# Patient Record
Sex: MALE | Race: BLACK OR AFRICAN AMERICAN | Employment: OTHER | URBAN - METROPOLITAN AREA
[De-identification: names, ages, dates, MRNs, and addresses within clinical notes are randomized per-mention and may not be internally consistent; named-entity substitution may affect disease eponyms.]

---

## 2017-04-03 ENCOUNTER — HOSPITAL ENCOUNTER (OUTPATIENT)
Dept: PHYSICAL THERAPY | Age: 77
Discharge: HOME | End: 2017-04-03
Payer: MEDICARE

## 2017-04-03 PROCEDURE — 97110 THERAPEUTIC EXERCISES: CPT

## 2017-04-03 PROCEDURE — 97162 PT EVAL MOD COMPLEX 30 MIN: CPT

## 2017-04-03 PROCEDURE — G8979 MOBILITY GOAL STATUS: HCPCS

## 2017-04-03 PROCEDURE — G8978 MOBILITY CURRENT STATUS: HCPCS

## 2017-04-03 NOTE — PROGRESS NOTES
Enoch [address]  [facility] [place] [facility]  [facility]  [address], [address] ,[address], [address]  Phone: [phone]  Fax: [phone] / [address] [address] PHYSICAL THERAPY SERVICES  Patient Name: Trevor Silvestre. : 1940   Medical   Diagnosis: Left hamstring muscle strain [S76.312A] Treatment Diagnosis: L quadriceps pain   Onset Date: 1 month ago     Referral Source: Neto Cadet MD [facility]): 4/3/2017   Prior Hospitalization: See medical history Provider #: [id number]   Prior Level of Function: Pain free with daily walks and tennis   Comorbidities: L quad T repair 25 year sago, R knee OA   Medications: Verified on Patient Summary List   The Plan of Care and following information is based on the information from the initial evaluation.   ===========================================================================================  Assessment / key information: Patient is a 67 y.o. male who presents to [facility] with diagnosis of  L knee/quadriceps pain. Patient reports gradual onset of L quad/knee pain that is worse in the morning and at night. He states pain does not prevent him from playing tennis and daily walks but notes pain during and after. Objective PT examination findings include (1) dec L knee ROM 2-110 degrees (2) dec strength quad 3-/5, hip abd 3+/5, HS 4-/5, glues 4-/5 MMT (3) palpable trigger points throughout quads/vastus rectus femoris (4) dec length HS and post hip mm (5) fair QS and unable to perform SLR due to weakness. A home exercise program was demonstrated and provided to address the above objective and functional deficits.  Patient can benefit from skilled PT interventions to improve ROM and strength, decrease pain, to facilitate performance of ADLs & improve overall functional status.   ===========================================================================================  Eval Complexity: History MEDIUM  Complexity : 1-2 comorbidities / personal factors will impact the outcome/ POC ;  Examination  MEDIUM Complexity : 3 Standardized tests and measures addressing body structure, function, activity limitation and / or participation in recreation ; Presentation LOW Complexity : Stable, uncomplicated ;  Decision Making MEDIUM Complexity : FOTO score of 26-74; Overall Complexity LOW   Problem List: pain affecting function, decrease ROM, decrease strength, decrease ADL/ functional abilitiies, decrease activity tolerance and decrease flexibility/ joint mobility, FOTO score 57  Treatment Plan may include any combination of the following: Therapeutic exercise, Therapeutic activities, Neuromuscular re-education, Physical agent/modality, Gait/balance training, Manual therapy including dry needling, Aquatic therapy and Patient education  Patient / Family readiness to learn indicated by: asking questions, trying to perform skills and interest  Persons(s) to be included in education: patient (P)  Barriers to Learning/Limitations: no  Measures taken:    Patient Goal (s): \"relief from pain\"   Patient self reported health status: good  Rehabilitation Potential: good   Short Term Goals: To be accomplished in  1-2  weeks:  1) Patient to be independent and compliant with initial HEP to prevent further disability. 2) Improve FOTO score to 61 indicating significant functional improvement. 3) Patient will improve quad strength in order to perform strong QS and SLR so strength is available for functional mobility.  Long Term Goals: To be accomplished in  3-4  weeks:  1) Patient to be independent & compliant with progressive HEP in preparation for D/C.  2) Improve FOTO score to 65 indicating significant functional improvement in order to return to PLOF. 3) Patient to report 75% improvement in SX indicating decreased overall pain when walking.   4) Patient to improve L quad strength to >/= 4+/5 MMT so strength is available for descending stairs. Frequency / Duration:   Patient to be seen  2-3  times per week for 3-4  weeks:  Patient / Caregiver education and instruction: self care, activity modification and exercises  G-Codes (GP): Mobility [id number] Current  CK= 40-59%   Goal  CJ= 20-39%. The severity rating is based on the FOTO Score    Therapist Signature: Carlita Cadet PT, DPT, MTC, CMTPT Date: 2/2/1635   Certification Period: 4/2/17 to 6/30/17 Time: 3:36 PM   ===========================================================================================  I certify that the above Physical Therapy Services are being furnished while the patient is under my care. I agree with the treatment plan and certify that this therapy is necessary. Physician Signature:        Date:       Time:     Please sign and return to [facility] or you may fax the signed copy to [phone]. Thank you.

## 2017-04-03 NOTE — PROGRESS NOTES
PHYSICAL THERAPY - DAILY TREATMENT NOTE    Patient Name: Julio C Avendano. Date: 4/3/2017  : 1940   YES Patient  Verified  Visit #:     Insurance: Payor: Tahir Santos / Plan: [place] MED[place] & [place] / Product Type: Medicare /      In time: 919 Out time: 530   Total Treatment Time: 55     Medicare Time Tracking (below)   Total Timed Codes (min):   1:1 Treatment Time:       TREATMENT AREA =  Left hamstring muscle strain [S76.312A]  SUBJECTIVE  Pain Level (on 0 to 10 scale):  4  / 10   Medication Changes/New allergies or changes in medical history, any new surgeries or procedures?     NO    If yes, update Summary List   Subjective Functional Status/Changes:  []  No changes reported     See POC          OBJECTIVE  Modalities Rationale:     decrease inflammation, decrease pain and increase tissue extensibility to improve patient's ability to perform functional ADLs   min [] Estim, type/location:                                      []  att     []  unatt     []  w/US     []  w/ice    []  w/heat    min []  Mechanical Traction: type/lbs                   []  pro   []  sup   []  int   []  cont    []  before manual    []  after manual    min []  Ultrasound, settings/location:      min []  Iontophoresis w/ dexamethasone, location:                                               []  take home patch       []  in clinic    min []  Ice     []  Heat    location/position:     min []  Vasopneumatic Device, press/temp:     min []  Other:    [] Skin assessment post-treatment (if applicable):    []  intact    []  redness- no adverse reaction     []redness  adverse reaction:        15 min Therapeutic Exercise:  [x]  See flow sheet   Rationale:      increase ROM and increase strength to improve the patients ability to regain full functional mobility of L knee for normal gait and stair negotiation      min Manual Therapy:    Rationale:      decrease pain, increase ROM, increase tissue extensibility and decrease trigger points to improve the patient's ability to regain full functional mobility     min Therapeutic Activity:    Rationale:    increase strength, improve coordination and increase proprioception to improve the patients ability to      min Neuromuscular Re-ed:    Rationale:    improve coordination, improve balance and increase proprioception to improve the patients ability to      min Gait Training:    Rationale:       min Patient Education:  YES  Reviewed HEP   [x]  Progressed/Changed HEP based on:   Issued written HEP and reviewed     Other Objective/Functional Measures:    See POC  TE per FS     Post Treatment Pain Level (on 0 to 10) scale:   4  / 10     ASSESSMENT  Assessment/Changes in Function:     Progressed treatment as appropriate with good patient tolerance     []  See Progress Note/Recertification   Patient will continue to benefit from skilled PT services to modify and progress therapeutic interventions, address functional mobility deficits, address ROM deficits, address strength deficits, analyze and address soft tissue restrictions, analyze and cue movement patterns, analyze and modify body mechanics/ergonomics and assess and modify postural abnormalities to attain remaining goals. Progress toward goals / Updated goals:    Progressing towards goals established in POC     PLAN  [x]  Upgrade activities as tolerated YES Continue plan of care   []  Discharge due to :    []  Other:      Therapist: Lilo Ellsworth, PT, DPT, MTC, CMTPT    Date: 4/3/2017 Time: 3:36 PM     No future appointments.

## 2017-04-06 ENCOUNTER — HOSPITAL ENCOUNTER (OUTPATIENT)
Dept: PHYSICAL THERAPY | Age: 77
Discharge: HOME | End: 2017-04-06
Payer: MEDICARE

## 2017-04-06 PROCEDURE — 97110 THERAPEUTIC EXERCISES: CPT

## 2017-04-06 PROCEDURE — 97530 THERAPEUTIC ACTIVITIES: CPT

## 2017-04-06 NOTE — PROGRESS NOTES
PHYSICAL THERAPY - DAILY TREATMENT NOTE      Patient Name: Lizette Griggs. Date: 2017  : 1940   YES Patient  Verified  Visit #:     Insurance: Payor: [place] MEDICARE / Plan: [place] MEDICARE PART A & B / Product Type: Medicare /      In time: 3:25 Out time: 4:25   Total Treatment Time: 60     Medicare Time Tracking (below)   Total Timed Codes (min):  50 1:1 Treatment Time:  50     TREATMENT AREA =  L knee pain    SUBJECTIVE    Pain Level (on 0 to 10 scale):  3  / 10   Medication Changes/New allergies or changes in medical history, any new surgeries or procedures? NO    If yes, update Summary List   Subjective Functional Status/Changes:  []  No changes reported     \"It doesn't hurt to play tennis but I don't ice after. I started doing my exercises at home. \"           OBJECTIVE    Modalities Rationale:  decrease inflammation, decrease pain and increase tissue extensibility to improve patient's ability to perform functional ADLs      min [] Estim, type/location:                                      []  att     []  unatt     []  w/US     []  w/ice    []  w/heat    min []  Mechanical Traction: type/lbs                   []  pro   []  sup   []  int   []  cont    []  before manual    []  after manual    min []  Ultrasound, settings/location:      min []  Iontophoresis w/ dexamethasone, location:                                               []  take home patch       []  in clinic   10 min [x]  Ice     []  Heat    location/position: Supine to L Knee    min []  Vasopneumatic Device, press/temp:     min []  Other:    [x] Skin assessment post-treatment (if applicable):    [x]  intact    []  redness- no adverse reaction     []redness  adverse reaction:      40 min Therapeutic Exercise:  [x]  See flow sheet   Rationale:  increase ROM and increase strength to improve the patients ability to regain full functional mobility of L knee for normal gait and stair negotiation     10 min Therapeutic Activity: Home TENS unit instruction/demonstration   Rationale:      min Neuromuscular Re-ed:    Rationale:      min Manual Therapy:    Rationale:         min Gait Training:    Rationale:      X min Patient Education:  YES  Reviewed HEP   []  Progressed/Changed HEP based on: Other Objective/Functional Measures:  L Knee ROM: 2-110     Initiated SLR, SAQs, SLS, TKE, bike, and mini squats. VCs for correct form during mini squats. Post Treatment Pain Level (on 0 to 10) scale:   0  / 10     ASSESSMENT    Assessment/Changes in Function:     Verbal and written instruction/demonstration of home TENS unit brought in by patient. Discussed utilizing ice following tennis practice/matches. No adverse effects with initiation of new TE but ext lag with SLR. []  See Progress Note/Recertification   Patient will continue to benefit from skilled PT services to modify and progress therapeutic interventions, address functional mobility deficits, address ROM deficits, address strength deficits, analyze and address soft tissue restrictions, analyze and cue movement patterns, analyze and modify body mechanics/ergonomics and assess and modify postural abnormalities to attain remaining goals. Progress toward goals / Updated goals: Working towards newly established goals. HEP established.       PLAN    [x]  Upgrade activities as tolerated YES Continue plan of care   []  Discharge due to :    []  Other:      Therapist: Parul Sánchez PTA    Date: 4/6/2017 Time: 11:48 AM

## 2017-04-13 ENCOUNTER — HOSPITAL ENCOUNTER (OUTPATIENT)
Dept: PHYSICAL THERAPY | Age: 77
Discharge: HOME | End: 2017-04-13
Payer: MEDICARE

## 2017-04-13 PROCEDURE — 97110 THERAPEUTIC EXERCISES: CPT

## 2017-04-13 NOTE — PROGRESS NOTES
PHYSICAL THERAPY - DAILY TREATMENT NOTE      Patient Name: Tom Friday. Date: 2017  : 1940   YES Patient  Verified  Visit #:   3   of   12  Insurance: Payor: [place] MEDICARE / Plan: [place] MEDICARE PART A & B / Product Type: Medicare /      In time: 9:25 Out time: 10:35   Total Treatment Time: 60     Medicare Time Tracking (below)   Total Timed Codes (min):  50 1:1 Treatment Time:  50     TREATMENT AREA =  L Knee P    SUBJECTIVE    Pain Level (on 0 to 10 scale):  1  / 10   Medication Changes/New allergies or changes in medical history, any new surgeries or procedures? NO    If yes, update Summary List   Subjective Functional Status/Changes:  []  No changes reported     \"I don't really have any pain today, just stiffness. I played tennis yesterday without pain. I sometimes do my home exercises. \"        OBJECTIVE    Modalities Rationale:   decrease inflammation, decrease pain and increase tissue extensibility to improve patient's ability to perform functional ADLs      min [] Estim, type/location:                                      []  att     []  unatt     []  w/US     []  w/ice    []  w/heat    min []  Mechanical Traction: type/lbs                   []  pro   []  sup   []  int   []  cont    []  before manual    []  after manual    min []  Ultrasound, settings/location:      min []  Iontophoresis w/ dexamethasone, location:                                               []  take home patch       []  in clinic   10 min [x]  Ice     []  Heat    location/position: Supine to L Knee    min []  Vasopneumatic Device, press/temp:     min []  Other:    [x] Skin assessment post-treatment (if applicable):    [x]  intact    []  redness- no adverse reaction     []redness  adverse reaction:      50 min Therapeutic Exercise:  [x]  See flow sheet   Rationale:   increase ROM and increase strength to improve the patients ability to regain full functional mobility of L knee for normal gait and stair negotiation      min Therapeutic Activity:    Rationale:      min Neuromuscular Re-ed:    Rationale:      min Manual Therapy:    Rationale:        min Gait Training:    Rationale:      X min Patient Education:  YES  Reviewed HEP   []  Progressed/Changed HEP based on: Other Objective/Functional Measures:    TE per flow sheet. Initiated SL hip abd, clamshells, and step ups. VC and TCs for SL hip abd; poor control   Post Treatment Pain Level (on 0 to 10) scale:   0  / 10     ASSESSMENT     Assessment/Changes in Function:     Mild ext lag noted with SLR due to quad weakness and pt fatigued by end 10 reps. Poor control with SL hip abd due to glut med weakness. Cont to encourage pt to ice following tennis matches and compliance with HEP. []  See Progress Note/Recertification   Patient will continue to benefit from skilled PT services to modify and progress therapeutic interventions, address functional mobility deficits, address ROM deficits, address strength deficits, analyze and address soft tissue restrictions, analyze and cue movement patterns, analyze and modify body mechanics/ergonomics and assess and modify postural abnormalities to attain remaining goals. Progress toward goals / Updated goals: Working towards STG #3.      PLAN    [x]  Upgrade activities as tolerated YES Continue plan of care   []  Discharge due to :    []  Other:      Therapist: Remington Duckworth PTA    Date: 4/13/2017 Time: 7:45 AM

## 2017-04-14 ENCOUNTER — APPOINTMENT (OUTPATIENT)
Dept: PHYSICAL THERAPY | Age: 77
End: 2017-04-14
Payer: MEDICARE

## 2017-04-18 ENCOUNTER — HOSPITAL ENCOUNTER (OUTPATIENT)
Dept: PHYSICAL THERAPY | Age: 77
Discharge: HOME | End: 2017-04-18
Payer: MEDICARE

## 2017-04-18 PROCEDURE — 97110 THERAPEUTIC EXERCISES: CPT

## 2017-04-18 NOTE — PROGRESS NOTES
PHYSICAL THERAPY - DAILY TREATMENT NOTE    Patient Name: Sina Mendez. Date: 2017  : 1940   YES Patient  Verified  Visit #:     Insurance: Payor: Elizabeth Camel / Plan: [place] MEDICARE PART A & B / Product Type: Medicare /      In time: 930 Out time: 3934   Total Treatment Time: 60     Medicare Time Tracking (below)   Total Timed Codes (min):  50 1:1 Treatment Time:  40     TREATMENT AREA =  Left leg pain [M79.605]  Left hamstring muscle strain [S76.312A]  SUBJECTIVE  Pain Level (on 0 to 10 scale):  1  / 10   Medication Changes/New allergies or changes in medical history, any new surgeries or procedures?     NO    If yes, update Summary List   Subjective Functional Status/Changes:  []  No changes reported     My knee and quad have still been sore but a little better       OBJECTIVE  Modalities Rationale:     decrease inflammation, decrease pain and increase tissue extensibility to improve patient's ability to perform functional ADLs   min [] Estim, type/location:                                      []  att     []  unatt     []  w/US     []  w/ice    []  w/heat    min []  Mechanical Traction: type/lbs                   []  pro   []  sup   []  int   []  cont    []  before manual    []  after manual    min []  Ultrasound, settings/location:      min []  Iontophoresis w/ dexamethasone, location:                                               []  take home patch       []  in clinic   10 min [x]  Ice     []  Heat    location/position: L knee in supine    min []  Vasopneumatic Device, press/temp:     min []  Other:    [] Skin assessment post-treatment (if applicable):    []  intact    []  redness- no adverse reaction     []redness  adverse reaction:        50/40 min Therapeutic Exercise:  [x]  See flow sheet   Rationale:      increase ROM and increase strength to improve the patients ability to regain full functional mobility of L knee for normal gait and stair negotiation      min Manual Therapy:    Rationale:      decrease pain, increase ROM, increase tissue extensibility and decrease trigger points to improve the patient's ability to regain full functional mobility     min Therapeutic Activity:    Rationale:    increase strength, improve coordination and increase proprioception to improve the patients ability to      min Neuromuscular Re-ed:    Rationale:    improve coordination, improve balance and increase proprioception to improve the patients ability to      min Gait Training:    Rationale:       min Patient Education:  YES  Reviewed HEP   [x]  Progressed/Changed HEP based on:   Issued written HEP and reviewed     Other Objective/Functional Measures:    TE per FS  Unable to perform SAQ/LAQ to full knee ext due to quad weakness    Mod lag with SLR   Post Treatment Pain Level (on 0 to 10) scale:   0  / 10     ASSESSMENT  Assessment/Changes in Function:     Progressed treatment as appropriate with good patient tolerance     []  See Progress Note/Recertification   Patient will continue to benefit from skilled PT services to modify and progress therapeutic interventions, address functional mobility deficits, address ROM deficits, address strength deficits, analyze and address soft tissue restrictions, analyze and cue movement patterns, analyze and modify body mechanics/ergonomics and assess and modify postural abnormalities to attain remaining goals.    Progress toward goals / Updated goals:    Progressing towards STG1, fair compliance     PLAN  [x]  Upgrade activities as tolerated YES Continue plan of care   []  Discharge due to :    []  Other:      Therapist: Karrie Noel PT, DPT, MTC, CMTPT    Date: 4/18/2017 Time: 3:24 PM     Future Appointments  Date Time Provider Rainer De La Cruz   5/10/2017 3:30 PM Tye Figueroa PT [facility]   5/15/2017 11:00 AM Tye Figueroa PT [facility]   5/18/2017 9:00 AM Tye Figueroa PT [facility]   5/22/2017 11:30 AM Tye Figueroa PT [facility]   5/25/2017 9:30 AM Lord Ortiz, PT [facility]   5/30/2017 9:30 AM Lord Ortiz, PT [facility]   6/1/2017 9:30 AM Lord Ortiz, PT [facility]

## 2017-05-10 ENCOUNTER — HOSPITAL ENCOUNTER (OUTPATIENT)
Dept: PHYSICAL THERAPY | Age: 77
End: 2017-05-10
Payer: MEDICARE

## 2017-05-15 ENCOUNTER — HOSPITAL ENCOUNTER (OUTPATIENT)
Dept: PHYSICAL THERAPY | Age: 77
Discharge: HOME | End: 2017-05-15
Payer: MEDICARE

## 2017-05-15 PROCEDURE — 97110 THERAPEUTIC EXERCISES: CPT

## 2017-05-15 NOTE — PROGRESS NOTES
PHYSICAL THERAPY - DAILY TREATMENT NOTE    Patient Name: Rajeev Cheema. Date: 5/15/2017  : 1940   YES Patient  Verified  Visit #:     Insurance: Payor: Marypetrona Car / Plan: [place] MEDICARE PART A & B / Product Type: Medicare /      In time: 1100 Out time: 1200   Total Treatment Time: 55     Medicare Time Tracking (below)   Total Timed Codes (min):  45 1:1 Treatment Time:  30     TREATMENT AREA =  Left leg pain [M79.605]  Left hamstring muscle strain [S76.312A]  SUBJECTIVE  Pain Level (on 0 to 10 scale):  1  / 10   Medication Changes/New allergies or changes in medical history, any new surgeries or procedures?     NO    If yes, update Summary List   Subjective Functional Status/Changes:  []  No changes reported     I didn't do my exercises while out of town       OBJECTIVE  Modalities Rationale:     decrease inflammation, decrease pain and increase tissue extensibility to improve patient's ability to perform functional ADLs   min [] Estim, type/location:                                      []  att     []  unatt     []  w/US     []  w/ice    []  w/heat    min []  Mechanical Traction: type/lbs                   []  pro   []  sup   []  int   []  cont    []  before manual    []  after manual    min []  Ultrasound, settings/location:      min []  Iontophoresis w/ dexamethasone, location:                                               []  take home patch       []  in clinic   10 min [x]  Ice     []  Heat    location/position: L knee in supine    min []  Vasopneumatic Device, press/temp:     min []  Other:    [] Skin assessment post-treatment (if applicable):    []  intact    []  redness- no adverse reaction     []redness  adverse reaction:        45/30 min Therapeutic Exercise:  [x]  See flow sheet   Rationale:      increase ROM and increase strength to improve the patients ability to regain full functional mobility of L knee for normal gait and stair negotiation      min Manual Therapy: Rationale:      decrease pain, increase ROM, increase tissue extensibility and decrease trigger points to improve the patient's ability to regain full functional mobility     min Therapeutic Activity:    Rationale:    increase strength, improve coordination and increase proprioception to improve the patients ability to      min Neuromuscular Re-ed:    Rationale:    improve coordination, improve balance and increase proprioception to improve the patients ability to      min Gait Training:    Rationale:       min Patient Education:  YES  Reviewed HEP   []  Progressed/Changed HEP based on: Other Objective/Functional Measures:    Reviewed importance of HEP compliance  Able to achieve full knee ext and good QS, required A with SLR and unable to perform SAQ/LAQto full knee ext   Post Treatment Pain Level (on 0 to 10) scale:   0  / 10     ASSESSMENT  Assessment/Changes in Function:     Progressed treatment as appropriate with good patient tolerance     []  See Progress Note/Recertification   Patient will continue to benefit from skilled PT services to modify and progress therapeutic interventions, address functional mobility deficits, address ROM deficits, address strength deficits, analyze and address soft tissue restrictions, analyze and cue movement patterns, analyze and modify body mechanics/ergonomics and assess and modify postural abnormalities to attain remaining goals.    Progress toward goals / Updated goals:    Progressing towards STG1, fair compliance     PLAN  [x]  Upgrade activities as tolerated YES Continue plan of care   []  Discharge due to :    []  Other:      Therapist: Emily Portillo, PT, DPT, MTC, CMTPT    Date: 5/15/2017 Time: 6:52 PM     Future Appointments  Date Time Provider Rainer De La Cruz   5/18/2017 9:00 AM Sana Gonzales PT [facility]   5/22/2017 11:30 AM Sana Gonzales PT [facility]   5/25/2017 9:30 AM Kelsey Isaac [facility]   5/30/2017 9:30 AM Sana Gonzales PT [facility]

## 2017-05-18 ENCOUNTER — HOSPITAL ENCOUNTER (OUTPATIENT)
Dept: PHYSICAL THERAPY | Age: 77
Discharge: HOME | End: 2017-05-18
Payer: MEDICARE

## 2017-05-18 PROCEDURE — 97110 THERAPEUTIC EXERCISES: CPT

## 2017-05-18 NOTE — PROGRESS NOTES
PHYSICAL THERAPY - DAILY TREATMENT NOTE    Patient Name: Anuradha Camacho. Date: 2017  : 1940   YES Patient  Verified  Visit #:     Insurance: Payor: Eric Antoine / Plan: [place] MED[place] B / Product Type: Medicare /      In time: 900 Out time: 1010   Total Treatment Time: 60     Medicare Time Tracking (below)   Total Timed Codes (min):  50 1:1 Treatment Time:  40     TREATMENT AREA =  Left leg pain [M79.605]  Left hamstring muscle strain [S76.312A]  SUBJECTIVE  Pain Level (on 0 to 10 scale):  4  / 10   Medication Changes/New allergies or changes in medical history, any new surgeries or procedures?     NO    If yes, update Summary List   Subjective Functional Status/Changes:  []  No changes reported     I didn't realize how weak my leg is       OBJECTIVE  Modalities Rationale:     decrease inflammation, decrease pain and increase tissue extensibility to improve patient's ability to perform functional ADLs   min [] Estim, type/location:                                      []  att     []  unatt     []  w/US     []  w/ice    []  w/heat    min []  Mechanical Traction: type/lbs                   []  pro   []  sup   []  int   []  cont    []  before manual    []  after manual    min []  Ultrasound, settings/location:      min []  Iontophoresis w/ dexamethasone, location:                                               []  take home patch       []  in clinic   10 min [x]  Ice     []  Heat    location/position: L knee in supine    min []  Vasopneumatic Device, press/temp:     min []  Other:    [] Skin assessment post-treatment (if applicable):    []  intact    []  redness- no adverse reaction     []redness  adverse reaction:        50/40 min Therapeutic Exercise:  [x]  See flow sheet   Rationale:      increase ROM and increase strength to improve the patients ability to regain full functional mobility of L knee for normal gait and stair negotiation      min Manual Therapy:    Rationale: decrease pain, increase ROM, increase tissue extensibility and decrease trigger points to improve the patient's ability to regain full functional mobility     min Therapeutic Activity:    Rationale:    increase strength, improve coordination and increase proprioception to improve the patients ability to      min Neuromuscular Re-ed:    Rationale:    improve coordination, improve balance and increase proprioception to improve the patients ability to      min Gait Training:    Rationale:       min Patient Education:  YES  Reviewed HEP   []  Progressed/Changed HEP based on: Other Objective/Functional Measures:    Able to perform SLR without A 2x5 reps but cont to have significant ext lag  TE per FS   Post Treatment Pain Level (on 0 to 10) scale:   0  / 10     ASSESSMENT  Assessment/Changes in Function:     Progressed treatment as appropriate with good patient tolerance     []  See Progress Note/Recertification   Patient will continue to benefit from skilled PT services to modify and progress therapeutic interventions, address functional mobility deficits, address ROM deficits, address strength deficits, analyze and address soft tissue restrictions, analyze and cue movement patterns, analyze and modify body mechanics/ergonomics and assess and modify postural abnormalities to attain remaining goals.    Progress toward goals / Updated goals:    Slow progress towards quad strength goals     PLAN  [x]  Upgrade activities as tolerated YES Continue plan of care   []  Discharge due to :    []  Other:      Therapist: Agnes Villavicencio PT, DPT, MTC, CMTPT    Date: 5/18/2017 Time: 3:53 PM     Future Appointments  Date Time Provider Rainer De La Cruz   5/22/2017 11:30 AM [place] [facility] [place] [facility] [place]/25/2017 9:30 AM Donna Tran [facility] [place] [facility] [place]   5/30/2017 9:30 AM Lino Da [place] [facility] [place] [facility] [place]

## 2017-05-22 ENCOUNTER — HOSPITAL ENCOUNTER (OUTPATIENT)
Dept: PHYSICAL THERAPY | Age: 77
Discharge: HOME | End: 2017-05-22
Payer: MEDICARE

## 2017-05-22 PROCEDURE — 97110 THERAPEUTIC EXERCISES: CPT

## 2017-05-22 PROCEDURE — 97032 APPL MODALITY 1+ESTIM EA 15: CPT

## 2017-05-22 NOTE — PROGRESS NOTES
PHYSICAL THERAPY - DAILY TREATMENT NOTE    Patient Name: Marylene Diver. Date: 2017  : 1940   YES Patient  Verified  Visit #:     Insurance: Payor: Dionicio Hernández / Plan: [place] MEDICARE PART A & B / Product Type: Medicare /      In time: 1130 Out time: 1240   Total Treatment Time: 65     Medicare Time Tracking (below)   Total Timed Codes (min):  55 1:1 Treatment Time:  40     TREATMENT AREA =  Left leg pain [M79.605]  Left hamstring muscle strain [S76.312A]  SUBJECTIVE  Pain Level (on 0 to 10 scale):  1  / 10   Medication Changes/New allergies or changes in medical history, any new surgeries or procedures?     NO    If yes, update Summary List   Subjective Functional Status/Changes:  []  No changes reported     I havent been doing the exercises much and didn't realize me knee is so weak     OBJECTIVE  Modalities Rationale:     decrease inflammation, decrease pain and increase tissue extensibility to improve patient's ability to perform functional ADLs  10 min [x] Estim, type/location:  [place] to L quad/VMO with QS and SAQ                                    [x]  att     []  unatt     []  w/US     []  w/ice    []  w/heat    min []  Mechanical Traction: type/lbs                   []  pro   []  sup   []  int   []  cont    []  before manual    []  after manual    min []  Ultrasound, settings/location:      min []  Iontophoresis w/ dexamethasone, location:                                               []  take home patch       []  in clinic   10 min [x]  Ice     []  Heat    location/position: L knee in supine    min []  Vasopneumatic Device, press/temp:     min []  Other:    [] Skin assessment post-treatment (if applicable):    []  intact    []  redness- no adverse reaction     []redness  adverse reaction:        45/30 min Therapeutic Exercise:  [x]  See flow sheet   Rationale:      increase ROM and increase strength to improve the patients ability to regain full functional mobility of L knee for normal gait and stair negotiation      min Manual Therapy:    Rationale:      decrease pain, increase ROM, increase tissue extensibility and decrease trigger points to improve the patient's ability to regain full functional mobility     min Therapeutic Activity:    Rationale:    increase strength, improve coordination and increase proprioception to improve the patients ability to      min Neuromuscular Re-ed:    Rationale:    improve coordination, improve balance and increase proprioception to improve the patients ability to      min Gait Training:    Rationale:       min Patient Education:  YES  Reviewed HEP   []  Progressed/Changed HEP based on: Other Objective/Functional Measures:    Unable to achieve full knee ext during SAQ/LAQ and cont to have lag with SLR  Improved QS with [place] estim but cont to require assist during SLR   Post Treatment Pain Level (on 0 to 10) scale:   0  / 10     ASSESSMENT  Assessment/Changes in Function:     Progressed treatment as appropriate with good patient tolerance     []  See Progress Note/Recertification   Patient will continue to benefit from skilled PT services to modify and progress therapeutic interventions, address functional mobility deficits, address ROM deficits, address strength deficits, analyze and address soft tissue restrictions, analyze and cue movement patterns, analyze and modify body mechanics/ergonomics and assess and modify postural abnormalities to attain remaining goals.    Progress toward goals / Updated goals:    Fair HEP compliance and progress towards STG 1, cont to review importance and encourage pt to perform HEP     PLAN  [x]  Upgrade activities as tolerated YES Continue plan of care   []  Discharge due to :    []  Other:      Therapist: Omid Portillo, PT, DPT, MTC, CMTPT    Date: 5/22/2017 Time: 3:50 PM     Future Appointments  Date Time Provider Rainer De La Cruz   5/25/2017 8:00 AM Mian WEI P[place] [facility] [place]   5/30/2017 9:30 Vanesa Bachelor, PT [facility] [place]

## 2017-05-25 ENCOUNTER — HOSPITAL ENCOUNTER (OUTPATIENT)
Dept: PHYSICAL THERAPY | Age: 77
Discharge: HOME | End: 2017-05-25
Payer: MEDICARE

## 2017-05-25 ENCOUNTER — APPOINTMENT (OUTPATIENT)
Dept: PHYSICAL THERAPY | Age: 77
End: 2017-05-25
Payer: MEDICARE

## 2017-05-25 PROCEDURE — 97110 THERAPEUTIC EXERCISES: CPT

## 2017-05-25 NOTE — PROGRESS NOTES
PHYSICAL THERAPY - DAILY TREATMENT NOTE    Patient Name: Sandy Venegas. Date: 2017  : 1940   YES Patient  Verified  Visit #:     Insurance: Payor: Koffi Sanders / Plan: [place] MEDICARE PART A & B / Product Type: Medicare /      In time: 8:00 Out time: 9:10   Total Treatment Time: 70     Medicare Time Tracking (below)   Total Timed Codes (min):  60 1:1 Treatment Time:  45     TREATMENT AREA =  Left leg pain [M79.605]  Left hamstring muscle strain [S76.312A]  SUBJECTIVE  Pain Level (on 0 to 10 scale):  0/ 10   Medication Changes/New allergies or changes in medical history, any new surgeries or procedures? NO    If yes, update Summary List   Subjective Functional Status/Changes:  []  No changes reported     \"My L knee has been okay, it hurts after playing tennis and I don't ice it. My R knee has also been bothering me some. I have not played tennis in ~ 2 weeks due to an infection in my R toe. \"       OBJECTIVE  Modalities Rationale:     decrease inflammation, decrease pain and increase tissue extensibility to improve patient's ability to perform functional ADLs  TC: NV min [x] Estim, type/location:  [place] to L quad/VMO with QS and SAQ                                    [x]  att     []  unatt     []  w/US     []  w/ice    []  w/heat    min []  Mechanical Traction: type/lbs                   []  pro   []  sup   []  int   []  cont    []  before manual    []  after manual    min []  Ultrasound, settings/location:      min []  Iontophoresis w/ dexamethasone, location:                                               []  take home patch       []  in clinic   10 min [x]  Ice     []  Heat    location/position: L knee in supine    min []  Vasopneumatic Device, press/temp:     min []  Other:    [x] Skin assessment post-treatment (if applicable):    [x]  intact    []  redness- no adverse reaction     []redness  adverse reaction:        45/60 min Therapeutic Exercise:  [x]  See flow sheet Rationale:      increase ROM and increase strength to improve the patients ability to regain full functional mobility of L knee for normal gait and stair negotiation      min Manual Therapy:    Rationale:      decrease pain, increase ROM, increase tissue extensibility and decrease trigger points to improve the patient's ability to regain full functional mobility     min Therapeutic Activity:    Rationale:    increase strength, improve coordination and increase proprioception to improve the patients ability to      min Neuromuscular Re-ed:    Rationale:    improve coordination, improve balance and increase proprioception to improve the patients ability to      min Gait Training:    Rationale:      X min Patient Education:  YES  Reviewed HEP   []  Progressed/Changed HEP based on: Other Objective/Functional Measures:    TE per flow sheet. Post Treatment Pain Level (on 0 to 10) scale:   0  / 10     ASSESSMENT  Assessment/Changes in Function:     Unable to achieve full knee ext during SAQ/LAQ and cont to have lag with SLR as well as fatigues quickly. []  See Progress Note/Recertification   Patient will continue to benefit from skilled PT services to modify and progress therapeutic interventions, address functional mobility deficits, address ROM deficits, address strength deficits, analyze and address soft tissue restrictions, analyze and cue movement patterns, analyze and modify body mechanics/ergonomics and assess and modify postural abnormalities to attain remaining goals. Progress toward goals / Updated goals:    Cont to review importance and encourage pt to perform HEP and utilizing ice following tennis sessions.       PLAN  [x]  Upgrade activities as tolerated YES Continue plan of care   []  Discharge due to :    []  Other:      Therapist: Juan Carlos Piña PTA    Date: 5/25/2017 Time: 3:50 PM     Future Appointments  Date Time Provider Rainer De La Cruz   5/25/2017 8:00 AM Benita Hernandez P[place] [facility] [place] [facility] [place]   5/30/2017 9:30 AM Ayad Moreno PT [facility] [place] [facility] [place]

## 2017-05-30 ENCOUNTER — HOSPITAL ENCOUNTER (OUTPATIENT)
Dept: PHYSICAL THERAPY | Age: 77
Discharge: HOME | End: 2017-05-30
Payer: MEDICARE

## 2017-05-30 PROCEDURE — 97032 APPL MODALITY 1+ESTIM EA 15: CPT

## 2017-05-30 PROCEDURE — 97110 THERAPEUTIC EXERCISES: CPT

## 2017-05-30 NOTE — PROGRESS NOTES
PHYSICAL THERAPY - DAILY TREATMENT NOTE    Patient Name: Hien Jeong. Date: 2017  : 1940   YES Patient  Verified  Visit #:     Insurance: Payor: Krishna Santiago / Plan: [place] MEDICARE PART A & B / Product Type: Medicare /      In time: 930 Out time: 2754   Total Treatment Time: 65     Medicare Time Tracking (below)   Total Timed Codes (min):  55 1:1 Treatment Time:  40     TREATMENT AREA =  Left leg pain [M79.605]  Left hamstring muscle strain [S76.312A]  SUBJECTIVE  Pain Level (on 0 to 10 scale):  0  / 10   Medication Changes/New allergies or changes in medical history, any new surgeries or procedures?     NO    If yes, update Summary List   Subjective Functional Status/Changes:  []  No changes reported     Luis Labella been doing my exercises some     OBJECTIVE  Modalities Rationale:     decrease inflammation, decrease pain and increase tissue extensibility to improve patient's ability to perform functional ADLs  10 min [x] Estim, type/location: [place] to rectus femoris and VMO 10/10sec with QS and SLR with A                                    [x]  att     []  unatt     []  w/US     []  w/ice    []  w/heat    min []  Mechanical Traction: type/lbs                   []  pro   []  sup   []  int   []  cont    []  before manual    []  after manual    min []  Ultrasound, settings/location:      min []  Iontophoresis w/ dexamethasone, location:                                               []  take home patch       []  in clinic   10 min [x]  Ice     []  Heat    location/position: L knee in supine    min []  Vasopneumatic Device, press/temp:     min []  Other:    [] Skin assessment post-treatment (if applicable):    []  intact    []  redness- no adverse reaction     []redness  adverse reaction:        45/30 min Therapeutic Exercise:  [x]  See flow sheet   Rationale:      increase ROM and increase strength to improve the patients ability to regain full functional mobility of L knee for normal gait and stair negotiation      min Manual Therapy:    Rationale:      decrease pain, increase ROM, increase tissue extensibility and decrease trigger points to improve the patient's ability to regain full functional mobility     min Therapeutic Activity:    Rationale:    increase strength, improve coordination and increase proprioception to improve the patients ability to      min Neuromuscular Re-ed:    Rationale:    improve coordination, improve balance and increase proprioception to improve the patients ability to      min Gait Training:    Rationale:       min Patient Education:  YES  Reviewed HEP   []  Progressed/Changed HEP based on: Other Objective/Functional Measures:    Improved QS with use of NMES but cont to require assist during SLR due to significant lag and quad weakness     Post Treatment Pain Level (on 0 to 10) scale:   0  / 10     ASSESSMENT  Assessment/Changes in Function:     Progressed treatment as appropriate with good patient tolerance     []  See Progress Note/Recertification   Patient will continue to benefit from skilled PT services to modify and progress therapeutic interventions, address functional mobility deficits, address ROM deficits, address strength deficits, analyze and address soft tissue restrictions, analyze and cue movement patterns, analyze and modify body mechanics/ergonomics and assess and modify postural abnormalities to attain remaining goals.    Progress toward goals / Updated goals:    Partially met STG1, progressing towards STG1      PLAN  [x]  Upgrade activities as tolerated YES Continue plan of care   []  Discharge due to :    []  Other:      Therapist: Taz Castro PT, DPT, MTC, CMTPT    Date: 5/30/2017 Time: 12:19 PM     Future Appointments  Date Time Provider Rainer De La Cruz   6/7/2017 11:30 AM Baldemar Galindo PT [facility] [place]   6/12/2017 10:30 AM Baldemar Galindo PT [facility] [place]   6/15/2017 10:30 AM Baldemar Galindo PT [facility] [place]   6/19/2017 11:00 AM Ashley HANSON Marlen Quintero [facility] [place]

## 2017-06-01 ENCOUNTER — APPOINTMENT (OUTPATIENT)
Dept: PHYSICAL THERAPY | Age: 77
End: 2017-06-01
Payer: MEDICARE

## 2017-06-07 ENCOUNTER — APPOINTMENT (OUTPATIENT)
Dept: PHYSICAL THERAPY | Age: 77
End: 2017-06-07
Payer: MEDICARE

## 2017-06-12 ENCOUNTER — HOSPITAL ENCOUNTER (OUTPATIENT)
Dept: PHYSICAL THERAPY | Age: 77
Discharge: HOME | End: 2017-06-12
Payer: MEDICARE

## 2017-06-12 PROCEDURE — 97110 THERAPEUTIC EXERCISES: CPT

## 2017-06-12 NOTE — PROGRESS NOTES
PHYSICAL THERAPY - DAILY TREATMENT NOTE    Patient Name: Mack Isabel. Date: 2017  : 1940   YES Patient  Verified  Visit #:   10   of   12  Insurance: Payor: Indira Magana / Plan: [place] MEDICARE PART A & B / Product Type: Medicare /      In time: 1235 Out time: 1130   Total Treatment Time: 60     Medicare Time Tracking (below)   Total Timed Codes (min):  50 1:1 Treatment Time:  40     TREATMENT AREA =  Left leg pain [M79.605]  Left hamstring muscle strain [S76.312A]  SUBJECTIVE  Pain Level (on 0 to 10 scale):  0  / 10   Medication Changes/New allergies or changes in medical history, any new surgeries or procedures?     NO    If yes, update Summary List   Subjective Functional Status/Changes:  []  No changes reported     Stairs are easier     OBJECTIVE  Modalities Rationale:     decrease inflammation, decrease pain and increase tissue extensibility to improve patient's ability to perform functional ADLs   min [x] Estim, type/location:                                     [x]  att     []  unatt     []  w/US     []  w/ice    []  w/heat    min []  Mechanical Traction: type/lbs                   []  pro   []  sup   []  int   []  cont    []  before manual    []  after manual    min []  Ultrasound, settings/location:      min []  Iontophoresis w/ dexamethasone, location:                                               []  take home patch       []  in clinic   10 min [x]  Ice     []  Heat    location/position: L knee in supine    min []  Vasopneumatic Device, press/temp:     min []  Other:    [] Skin assessment post-treatment (if applicable):    []  intact    []  redness- no adverse reaction     []redness  adverse reaction:        50/40 min Therapeutic Exercise:  [x]  See flow sheet   Rationale:      increase ROM and increase strength to improve the patients ability to regain full functional mobility of L knee for normal gait and stair negotiation      min Manual Therapy:    Rationale:      decrease pain, increase ROM, increase tissue extensibility and decrease trigger points to improve the patient's ability to regain full functional mobility     min Therapeutic Activity:    Rationale:    increase strength, improve coordination and increase proprioception to improve the patients ability to      min Neuromuscular Re-ed:    Rationale:    improve coordination, improve balance and increase proprioception to improve the patients ability to      min Gait Training:    Rationale:       min Patient Education:  YES  Reviewed HEP   []  Progressed/Changed HEP based on: Other Objective/Functional Measures:    TE per FS   Post Treatment Pain Level (on 0 to 10) scale:   0  / 10     ASSESSMENT  Assessment/Changes in Function:     Progressed treatment as appropriate with good patient tolerance     []  See Progress Note/Recertification   Patient will continue to benefit from skilled PT services to modify and progress therapeutic interventions, address functional mobility deficits, address ROM deficits, address strength deficits, analyze and address soft tissue restrictions, analyze and cue movement patterns, analyze and modify body mechanics/ergonomics and assess and modify postural abnormalities to attain remaining goals.    Progress toward goals / Updated goals:    Met STG1, reports HEP compliance     PLAN  [x]  Upgrade activities as tolerated YES Continue plan of care   []  Discharge due to :    []  Other:      Therapist: Omid Portillo PT, DPT, MTC, CMTPT    Date: 6/12/2017 Time: 12:19 PM     Future Appointments  Date Time Provider Rainer De La Cruz   6/15/2017 10:30 AM [place] [facility] [place]   6/19/2017 11:00 AM [place]yda Shah [place] [facility] [place]

## 2017-06-15 ENCOUNTER — HOSPITAL ENCOUNTER (OUTPATIENT)
Dept: PHYSICAL THERAPY | Age: 77
Discharge: HOME | End: 2017-06-15
Payer: MEDICARE

## 2017-06-15 PROCEDURE — G8979 MOBILITY GOAL STATUS: HCPCS

## 2017-06-15 PROCEDURE — 97110 THERAPEUTIC EXERCISES: CPT

## 2017-06-15 PROCEDURE — G8980 MOBILITY D/C STATUS: HCPCS

## 2017-06-15 NOTE — PROGRESS NOTES
PHYSICAL THERAPY - DAILY TREATMENT NOTE    Patient Name: Kenzie Adair. Date: 6/15/2017  : 1940   YES Patient  Verified  Visit #:     Insurance: Payor: Mitchell Christianson / Plan: [place] MEDICARE PART A & B / Product Type: Medicare /      In time: 6422 Out time: 1140   Total Treatment Time: 55     Medicare Time Tracking (below)   Total Timed Codes (min):  45 1:1 Treatment Time:  25     TREATMENT AREA =  Left leg pain [M79.605]  Left hamstring muscle strain [S76.312A]  SUBJECTIVE  Pain Level (on 0 to 10 scale):  0  / 10   Medication Changes/New allergies or changes in medical history, any new surgeries or procedures? NO    If yes, update Summary List   Subjective Functional Status/Changes:  []  No changes reported     Pt reports he is no longer having knee pain, playing tennis 2-3x/week, and stairs are easier.          OBJECTIVE  Modalities Rationale:     decrease inflammation, decrease pain and increase tissue extensibility to improve patient's ability to perform functional ADLs   min [] Estim, type/location:                                      []  att     []  unatt     []  w/US     []  w/ice    []  w/heat    min []  Mechanical Traction: type/lbs                   []  pro   []  sup   []  int   []  cont    []  before manual    []  after manual    min []  Ultrasound, settings/location:      min []  Iontophoresis w/ dexamethasone, location:                                               []  take home patch       []  in clinic   10 min [x]  Ice     []  Heat    location/position: L knee in supine    min []  Vasopneumatic Device, press/temp:     min []  Other:    [] Skin assessment post-treatment (if applicable):    []  intact    []  redness- no adverse reaction     []redness  adverse reaction:        45/25 min Therapeutic Exercise:  [x]  See flow sheet   Rationale:      increase ROM and increase strength to improve the patients ability to regain functional mobility of L knee for walking and playing tennis      min Manual Therapy:    Rationale:      decrease pain, increase ROM, increase tissue extensibility and decrease trigger points to improve the patient's ability to regain full functional mobility     min Therapeutic Activity:    Rationale:    increase strength, improve coordination and increase proprioception to improve the patients ability to      min Neuromuscular Re-ed:    Rationale:    improve coordination, improve balance and increase proprioception to improve the patients ability to      min Gait Training:    Rationale:       min Patient Education:  YES  Reviewed HEP   [x]  Progressed/Changed HEP based on:   reviewed     Other Objective/Functional Measures:    TE per FS. FOTO 60     Post Treatment Pain Level (on 0 to 10) scale:   0  / 10     ASSESSMENT  Assessment/Changes in Function:     Progressed treatment as appropriate with good patient tolerance     [x]  See Progress Note/Recertification   Patient will continue to benefit from skilled PT services to modify and progress therapeutic interventions, address functional mobility deficits, address ROM deficits, address strength deficits, analyze and address soft tissue restrictions, analyze and cue movement patterns, analyze and modify body mechanics/ergonomics and assess and modify postural abnormalities to attain remaining goals. Progress toward goals / Updated goals:    See DC     PLAN  []  Upgrade activities as tolerated YES Continue plan of care   [x]  Discharge due to : Program complete, pt requested DC   []  Other:      Therapist: Katharina Oropeza PT, DPT, MTC, CMTPT  Benita Florence [facility]    Date: 6/15/2017 Time: 12:05 PM     No future appointments.

## 2017-06-15 NOTE — PROGRESS NOTES
Enoch Asifkikim Henderson [address]  [facility] [place] [facility]  [facility], [address] ,[address], [address]  Phone: [phone]  Fax: [phone] FOR PHYSICAL THERAPY          Patient Name: Marylene Diver. : 1940   Treatment/Medical Diagnosis: Left leg pain [M79.605]  Left hamstring muscle strain [S76.312A]   Onset Date: 1 month prior to eval    Referral Source: Linda Girard MD [facility]): 17   Prior Hospitalization: See Medical History Provider #: [id number]   Prior Level of Function: Pain free with daily walks and tennis   Comorbidities: L quad T repair 25 year sago, R knee OA   Medications: Verified on Patient Summary List   Visits from [facility]: 11 Missed Visits: 0     Goal/Measure of Progress Goal Met? 1. Patient to be independent & compliant with progressive HEP in preparation for D/C. Status at last Eval: established Current Status: I with HEP yes   2. Improve FOTO score to 65 indicating significant functional improvement in order to return to PLOF. Status at last Eval: 57 Current Status: 60 Partially met   3. Patient to improve L quad strength to >/= 4+/5 MMT so strength is available for descending stairs. Status at last Eval: 3-/5 MMT Current Status: 4-/5 MMt Partially met     Key Functional Changes/Progress: Patient is a 67 y.o. male who presents to [facility] with diagnosis of  L knee/quadriceps pain. Mr. Shona Gayle made slow progress in PT, likely due to poor HEP compliance but reported improved compliance at time of DC. He noted full resolve of pain and swelling, increased ease with stair negotiation and had returned to playing tennis. L quadriceps strength remained most limited but was slowly improving. G-Codes (GP): Mobility  [id number] Goal  CJ= 20-39%  D/C  CK= 40-59%. The severity rating is based on the FOTO Score    Assessments/Recommendations: Discontinue therapy.  Progressing towards or have reached established goals. If you have any questions/comments please contact us directly at [phone]. Thank you for allowing us to assist in the care of your patient.     Therapist Signature: Carlita Cadet PT, KEVONT, MTC, CMTPT Date: 6/15/2017   Reporting Period: 5/15/17 to 6/15/17 Time: 12:29 PM

## 2017-06-19 ENCOUNTER — APPOINTMENT (OUTPATIENT)
Dept: PHYSICAL THERAPY | Age: 77
End: 2017-06-19
Payer: MEDICARE

## 2017-09-12 NOTE — PROGRESS NOTES
[address] PHYSICAL THERAPY  [address] [facility] [address], [place], [address] -   Phone: [phone]  Fax: [phone]  [x]  PROGRESS NOTE  []   [facility] SUMMARY  Patient Name: Marylene Diver. : 1940   Treatment Diagnosis: Left leg pain [M79.605]  Left hamstring muscle strain [U53.922J]     Referral Source: Linda Girard MD     Date of Initial Visit: 4/3/17 Attended Visits: 5 Missed Visits: 0     SUMMARY OF TREATMENT  Therapeutic exercises including ROM, strengthening and stretching; gait and balance training, postural re-education, modalities: CP, HEP instruction. CURRENT STATUS  Patient is a 67 y.o. male who presents to [facility] at [facility] with diagnosis of  L knee/quadriceps pain. He has returned to PT after 1 month absence while [place]. Shona Diallo notes dec L knee pain but reports poor HEP compliance while traveling and continues to have significant L quadriceps weakness. Goal/Measure of Progress Goal Met? 1. Patient to be independent and compliant with initial HEP to prevent further disability. Status at last Eval: Established Current Status: Non compliant no   2. Patient will improve quad strength in order to perform strong QS and SLR so strength is available for functional mobility. Status at last Eval: Pain, unable to perform Current Status: Fair QS  Unable to perform SLR no     New Goals to be achieved in __3-4__  Weeks:  1. Patient to be independent & compliant with progressive HEP in preparation for D/C.   2.  Patient to improve L quad strength to >/= 4+/5 MMT so strength is available for descending stairs. 3.  Improve FOTO score to 65 indicating significant functional improvement in order to return to PLOF. G-Codes (GP): Mobility [id number] Current  CK= 40-59%  [id number] Goal  CJ= 20-39%. The severity rating is based on the FOTO Score    RECOMMENDATIONS  Continue therapy per initial plan/protocol    Specifics:  The patient could benefit from continued PT 2-3x/week for 3-4 weeks to progress towards achieving all LTGs. If you have any questions/comments please contact us directly at [phone]. Thank you for allowing us to assist in the care of your patient. Therapist Signature: Lovely Jean Baptiste, PT, DPT, MTC, CMTPT Date: 5/15/17   Reporting Period: 4/3/17 to 5/15/17 Time: 12:05 PM   NOTE TO PHYSICIAN:  PLEASE COMPLETE THE ORDERS BELOW AND FAX TO   [facility]: [phone]  If you are unable to process this request in 24 hours please contact our office: [phone]    ___ I have read the above report and request that my patient continue as recommended.   ___ I have read the above report and request that my patient continue therapy with the following changes/special instructions:_________________________________________________________   ___ I have read the above report and request that my patient be discharged from therapy.      Physician Signature:        Date:       Time:

## 2018-01-01 ENCOUNTER — ANESTHESIA EVENT (OUTPATIENT)
Dept: SURGERY | Age: 78
DRG: 657 | End: 2018-01-01
Payer: MEDICARE

## 2018-01-01 ENCOUNTER — HOSPITAL ENCOUNTER (INPATIENT)
Age: 78
LOS: 2 days | Discharge: HOME | DRG: 657 | End: 2018-09-08
Attending: UROLOGY | Admitting: UROLOGY
Payer: MEDICARE

## 2018-01-01 ENCOUNTER — HOSPITAL ENCOUNTER (OUTPATIENT)
Dept: LAB | Age: 78
Discharge: HOME | End: 2018-10-10

## 2018-01-01 ENCOUNTER — ANESTHESIA (OUTPATIENT)
Dept: SURGERY | Age: 78
DRG: 657 | End: 2018-01-01
Payer: MEDICARE

## 2018-01-01 VITALS
BODY MASS INDEX: 22.34 KG/M2 | RESPIRATION RATE: 18 BRPM | HEART RATE: 91 BPM | HEIGHT: 76 IN | TEMPERATURE: 98.3 F | SYSTOLIC BLOOD PRESSURE: 107 MMHG | DIASTOLIC BLOOD PRESSURE: 60 MMHG | OXYGEN SATURATION: 95 % | WEIGHT: 183.44 LBS

## 2018-01-01 LAB
ABO + RH BLD: NORMAL
ANION GAP SERPL CALC-SCNC: 6 MMOL/L (ref 3–18)
ANION GAP SERPL CALC-SCNC: 7 MMOL/L (ref 3–18)
BLOOD GROUP ANTIBODIES SERPL: NORMAL
BUN SERPL-MCNC: 14 MG/DL (ref 7–18)
BUN SERPL-MCNC: 17 MG/DL (ref 7–18)
BUN/CREAT SERPL: 14 (ref 12–20)
BUN/CREAT SERPL: 15 (ref 12–20)
CALCIUM SERPL-MCNC: 8.6 MG/DL (ref 8.5–10.1)
CALCIUM SERPL-MCNC: 8.8 MG/DL (ref 8.5–10.1)
CHLORIDE SERPL-SCNC: 102 MMOL/L (ref 100–108)
CHLORIDE SERPL-SCNC: 105 MMOL/L (ref 100–108)
CO2 SERPL-SCNC: 30 MMOL/L (ref 21–32)
CO2 SERPL-SCNC: 31 MMOL/L (ref 21–32)
CREAT FLD-MCNC: 1.04 MG/DL
CREAT SERPL-MCNC: 0.97 MG/DL (ref 0.6–1.3)
CREAT SERPL-MCNC: 1.16 MG/DL (ref 0.6–1.3)
ERYTHROCYTE [DISTWIDTH] IN BLOOD BY AUTOMATED COUNT: 12.4 % (ref 11.6–14.5)
GLUCOSE BLD STRIP.AUTO-MCNC: 108 MG/DL (ref 70–110)
GLUCOSE BLD STRIP.AUTO-MCNC: 152 MG/DL (ref 70–110)
GLUCOSE BLD STRIP.AUTO-MCNC: 172 MG/DL (ref 70–110)
GLUCOSE BLD STRIP.AUTO-MCNC: 172 MG/DL (ref 70–110)
GLUCOSE BLD STRIP.AUTO-MCNC: 181 MG/DL (ref 70–110)
GLUCOSE BLD STRIP.AUTO-MCNC: 185 MG/DL (ref 70–110)
GLUCOSE BLD STRIP.AUTO-MCNC: 190 MG/DL (ref 70–110)
GLUCOSE BLD STRIP.AUTO-MCNC: 195 MG/DL (ref 70–110)
GLUCOSE BLD STRIP.AUTO-MCNC: 204 MG/DL (ref 70–110)
GLUCOSE BLD STRIP.AUTO-MCNC: 238 MG/DL (ref 70–110)
GLUCOSE BLD STRIP.AUTO-MCNC: 80 MG/DL (ref 70–110)
GLUCOSE SERPL-MCNC: 165 MG/DL (ref 74–99)
GLUCOSE SERPL-MCNC: 181 MG/DL (ref 74–99)
HBA1C MFR BLD: 8.5 % (ref 4.2–5.6)
HCT VFR BLD AUTO: 33.3 % (ref 36–48)
HCT VFR BLD AUTO: 35.9 % (ref 36–48)
HGB BLD-MCNC: 10.4 G/DL (ref 13–16)
HGB BLD-MCNC: 11.4 G/DL (ref 13–16)
MCH RBC QN AUTO: 30.7 PG (ref 24–34)
MCHC RBC AUTO-ENTMCNC: 31.2 G/DL (ref 31–37)
MCV RBC AUTO: 98.2 FL (ref 74–97)
PLATELET # BLD AUTO: 327 K/UL (ref 135–420)
PMV BLD AUTO: 10.4 FL (ref 9.2–11.8)
POTASSIUM SERPL-SCNC: 4.7 MMOL/L (ref 3.5–5.5)
POTASSIUM SERPL-SCNC: 4.9 MMOL/L (ref 3.5–5.5)
RBC # BLD AUTO: 3.39 M/UL (ref 4.7–5.5)
SODIUM SERPL-SCNC: 140 MMOL/L (ref 136–145)
SODIUM SERPL-SCNC: 141 MMOL/L (ref 136–145)
SPECIMEN EXP DATE BLD: NORMAL
SPECIMEN SOURCE FLD: NORMAL
WBC # BLD AUTO: 8.9 K/UL (ref 4.6–13.2)

## 2018-01-01 PROCEDURE — 82962 GLUCOSE BLOOD TEST: CPT

## 2018-01-01 PROCEDURE — 77030018842 HC SOL IRR SOD CL 9% BAXT -A: Performed by: UROLOGY

## 2018-01-01 PROCEDURE — 74011250637 HC RX REV CODE- 250/637: Performed by: UROLOGY

## 2018-01-01 PROCEDURE — 88307 TISSUE EXAM BY PATHOLOGIST: CPT | Performed by: UROLOGY

## 2018-01-01 PROCEDURE — 77030002966 HC SUT PDS J&J -A: Performed by: UROLOGY

## 2018-01-01 PROCEDURE — 65270000029 HC RM PRIVATE

## 2018-01-01 PROCEDURE — 74011636637 HC RX REV CODE- 636/637: Performed by: NURSE PRACTITIONER

## 2018-01-01 PROCEDURE — 77030013079 HC BLNKT BAIR HGGR 3M -A: Performed by: ANESTHESIOLOGY

## 2018-01-01 PROCEDURE — 0TT74ZZ RESECTION OF LEFT URETER, PERCUTANEOUS ENDOSCOPIC APPROACH: ICD-10-PCS | Performed by: UROLOGY

## 2018-01-01 PROCEDURE — 77030027876 HC STPLR ENDOSC FLX PWR J&J -G1: Performed by: UROLOGY

## 2018-01-01 PROCEDURE — 77030005546 HC CATH URETH FOL 3W BARD -A: Performed by: UROLOGY

## 2018-01-01 PROCEDURE — 77030009848 HC PASSR SUT SET COOP -C: Performed by: UROLOGY

## 2018-01-01 PROCEDURE — 74011636637 HC RX REV CODE- 636/637

## 2018-01-01 PROCEDURE — 77030020268 HC MISC GENERAL SUPPLY: Performed by: UROLOGY

## 2018-01-01 PROCEDURE — 77030009527 HC GEL PRT SYS AMR -E: Performed by: UROLOGY

## 2018-01-01 PROCEDURE — 36415 COLL VENOUS BLD VENIPUNCTURE: CPT

## 2018-01-01 PROCEDURE — 74011250636 HC RX REV CODE- 250/636: Performed by: UROLOGY

## 2018-01-01 PROCEDURE — 77030008462 HC STPLR SKN PROX J&J -A: Performed by: UROLOGY

## 2018-01-01 PROCEDURE — 77030008771 HC TU NG SALEM SUMP -A: Performed by: ANESTHESIOLOGY

## 2018-01-01 PROCEDURE — 74011636637 HC RX REV CODE- 636/637: Performed by: UROLOGY

## 2018-01-01 PROCEDURE — 77030002986 HC SUT PROL J&J -A: Performed by: UROLOGY

## 2018-01-01 PROCEDURE — 74011250636 HC RX REV CODE- 250/636: Performed by: NURSE ANESTHETIST, CERTIFIED REGISTERED

## 2018-01-01 PROCEDURE — 0TT14ZZ RESECTION OF LEFT KIDNEY, PERCUTANEOUS ENDOSCOPIC APPROACH: ICD-10-PCS | Performed by: UROLOGY

## 2018-01-01 PROCEDURE — 77030018719 HC DRSG PTCH ANTIMIC J&J -A: Performed by: UROLOGY

## 2018-01-01 PROCEDURE — 77030016151 HC PROTCTR LNS DFOG COVD -B: Performed by: UROLOGY

## 2018-01-01 PROCEDURE — 77030032490 HC SLV COMPR SCD KNE COVD -B: Performed by: UROLOGY

## 2018-01-01 PROCEDURE — 77030003028 HC SUT VCRL J&J -A: Performed by: UROLOGY

## 2018-01-01 PROCEDURE — 77030010545

## 2018-01-01 PROCEDURE — 77030020703 HC SEAL CANN DISP INTU -B: Performed by: UROLOGY

## 2018-01-01 PROCEDURE — 77030035277 HC OBTRTR BLDELSS DISP INTU -B: Performed by: UROLOGY

## 2018-01-01 PROCEDURE — 77030035048 HC TRCR ENDOSC OPTCL COVD -B: Performed by: UROLOGY

## 2018-01-01 PROCEDURE — 80048 BASIC METABOLIC PNL TOTAL CA: CPT | Performed by: UROLOGY

## 2018-01-01 PROCEDURE — 77030012422 HC DRN WND COVD -A: Performed by: UROLOGY

## 2018-01-01 PROCEDURE — 76210000006 HC OR PH I REC 0.5 TO 1 HR: Performed by: UROLOGY

## 2018-01-01 PROCEDURE — 77030010507 HC ADH SKN DERMBND J&J -B: Performed by: UROLOGY

## 2018-01-01 PROCEDURE — 77030009852 HC PCH RTVR ENDOSC COVD -B: Performed by: UROLOGY

## 2018-01-01 PROCEDURE — 80048 BASIC METABOLIC PNL TOTAL CA: CPT

## 2018-01-01 PROCEDURE — 36415 COLL VENOUS BLD VENIPUNCTURE: CPT | Performed by: UROLOGY

## 2018-01-01 PROCEDURE — 77030008477 HC STYL SATN SLP COVD -A: Performed by: ANESTHESIOLOGY

## 2018-01-01 PROCEDURE — 07TD4ZZ RESECTION OF AORTIC LYMPHATIC, PERCUTANEOUS ENDOSCOPIC APPROACH: ICD-10-PCS | Performed by: UROLOGY

## 2018-01-01 PROCEDURE — 77030013567 HC DRN WND RESERV BARD -A: Performed by: UROLOGY

## 2018-01-01 PROCEDURE — 76060000041 HC ANESTHESIA 5 TO 5.5 HR: Performed by: UROLOGY

## 2018-01-01 PROCEDURE — 77030035045 HC TRCR ENDOSC VRSPRT BLDLSS COVD -B: Performed by: UROLOGY

## 2018-01-01 PROCEDURE — 77030012407 HC DRN WND BARD -B: Performed by: UROLOGY

## 2018-01-01 PROCEDURE — 77030010513 HC APPL CLP LIG J&J -C: Performed by: UROLOGY

## 2018-01-01 PROCEDURE — 77030031139 HC SUT VCRL2 J&J -A: Performed by: UROLOGY

## 2018-01-01 PROCEDURE — 77030027138 HC INCENT SPIROMETER -A

## 2018-01-01 PROCEDURE — 77030010545: Performed by: UROLOGY

## 2018-01-01 PROCEDURE — 74011000272 HC RX REV CODE- 272: Performed by: UROLOGY

## 2018-01-01 PROCEDURE — 77030014647 HC SEAL FBRN TISSL BAXT -D: Performed by: UROLOGY

## 2018-01-01 PROCEDURE — 77030010517 HC APPL SEAL FLOSEL BAXT -B: Performed by: UROLOGY

## 2018-01-01 PROCEDURE — 77030020263 HC SOL INJ SOD CL0.9% LFCR 1000ML: Performed by: UROLOGY

## 2018-01-01 PROCEDURE — 74011250636 HC RX REV CODE- 250/636

## 2018-01-01 PROCEDURE — 77030034850: Performed by: UROLOGY

## 2018-01-01 PROCEDURE — 77030007956 HC PCH ENDOSC SPEC COVD -C: Performed by: UROLOGY

## 2018-01-01 PROCEDURE — 74011000250 HC RX REV CODE- 250: Performed by: UROLOGY

## 2018-01-01 PROCEDURE — 83036 HEMOGLOBIN GLYCOSYLATED A1C: CPT | Performed by: NURSE PRACTITIONER

## 2018-01-01 PROCEDURE — 85027 COMPLETE CBC AUTOMATED: CPT | Performed by: UROLOGY

## 2018-01-01 PROCEDURE — 77030018390 HC SPNG HEMSTAT2 J&J -B: Performed by: UROLOGY

## 2018-01-01 PROCEDURE — 77030014646 HC SEAL FBRN TISSL 2ML W/APPL BAXT -C: Performed by: UROLOGY

## 2018-01-01 PROCEDURE — 97165 OT EVAL LOW COMPLEX 30 MIN: CPT

## 2018-01-01 PROCEDURE — 77030010935 HC CLP LIG ABSRB TELE -B: Performed by: UROLOGY

## 2018-01-01 PROCEDURE — 77030008683 HC TU ET CUF COVD -A: Performed by: ANESTHESIOLOGY

## 2018-01-01 PROCEDURE — 77030022704 HC SUT VLOC COVD -B: Performed by: UROLOGY

## 2018-01-01 PROCEDURE — 74011000250 HC RX REV CODE- 250

## 2018-01-01 PROCEDURE — 82570 ASSAY OF URINE CREATININE: CPT | Performed by: UROLOGY

## 2018-01-01 PROCEDURE — 77030018836 HC SOL IRR NACL ICUM -A: Performed by: UROLOGY

## 2018-01-01 PROCEDURE — 77030002933 HC SUT MCRYL J&J -A: Performed by: UROLOGY

## 2018-01-01 PROCEDURE — 8E0W4CZ ROBOTIC ASSISTED PROCEDURE OF TRUNK REGION, PERCUTANEOUS ENDOSCOPIC APPROACH: ICD-10-PCS | Performed by: UROLOGY

## 2018-01-01 PROCEDURE — 36415 COLL VENOUS BLD VENIPUNCTURE: CPT | Performed by: NURSE PRACTITIONER

## 2018-01-01 PROCEDURE — 77030010939 HC CLP LIG TELE -B: Performed by: UROLOGY

## 2018-01-01 PROCEDURE — 77030035279 HC SEAL VSL ENDOWR XI INTU -I2: Performed by: UROLOGY

## 2018-01-01 PROCEDURE — 97161 PT EVAL LOW COMPLEX 20 MIN: CPT

## 2018-01-01 PROCEDURE — 77030027491 HC SHR ENDOSC COVD -B: Performed by: UROLOGY

## 2018-01-01 PROCEDURE — 86901 BLOOD TYPING SEROLOGIC RH(D): CPT | Performed by: UROLOGY

## 2018-01-01 PROCEDURE — 77030002896 HC SUT CLP ABSRB J&J -B: Performed by: UROLOGY

## 2018-01-01 PROCEDURE — 97535 SELF CARE MNGMENT TRAINING: CPT

## 2018-01-01 PROCEDURE — 85018 HEMOGLOBIN: CPT

## 2018-01-01 PROCEDURE — 77030034696 HC CATH URETH FOL 2W BARD -A: Performed by: UROLOGY

## 2018-01-01 PROCEDURE — 77030016153 HC RELD STPLR VASC J&J -B: Performed by: UROLOGY

## 2018-01-01 PROCEDURE — 74011250637 HC RX REV CODE- 250/637: Performed by: NURSE ANESTHETIST, CERTIFIED REGISTERED

## 2018-01-01 PROCEDURE — 76010000882 HC OR TIME 5 TO 5.5HR INTENSV - TIER 2: Performed by: UROLOGY

## 2018-01-01 RX ORDER — HYDROMORPHONE HYDROCHLORIDE 1 MG/ML
1 INJECTION, SOLUTION INTRAMUSCULAR; INTRAVENOUS; SUBCUTANEOUS
Status: DISCONTINUED | OUTPATIENT
Start: 2018-01-01 | End: 2018-01-01 | Stop reason: HOSPADM

## 2018-01-01 RX ORDER — DOCUSATE SODIUM 100 MG/1
100 CAPSULE, LIQUID FILLED ORAL
Qty: 60 CAP | Refills: 2 | Status: SHIPPED | OUTPATIENT
Start: 2018-01-01 | End: 2018-01-01

## 2018-01-01 RX ORDER — LIDOCAINE HYDROCHLORIDE 10 MG/ML
0.1 INJECTION, SOLUTION EPIDURAL; INFILTRATION; INTRACAUDAL; PERINEURAL AS NEEDED
Status: DISCONTINUED | OUTPATIENT
Start: 2018-01-01 | End: 2018-01-01 | Stop reason: HOSPADM

## 2018-01-01 RX ORDER — HYDROMORPHONE HYDROCHLORIDE 1 MG/ML
INJECTION, SOLUTION INTRAMUSCULAR; INTRAVENOUS; SUBCUTANEOUS AS NEEDED
Status: DISCONTINUED | OUTPATIENT
Start: 2018-01-01 | End: 2018-01-01 | Stop reason: HOSPADM

## 2018-01-01 RX ORDER — ONDANSETRON 2 MG/ML
INJECTION INTRAMUSCULAR; INTRAVENOUS AS NEEDED
Status: DISCONTINUED | OUTPATIENT
Start: 2018-01-01 | End: 2018-01-01 | Stop reason: HOSPADM

## 2018-01-01 RX ORDER — MORPHINE SULFATE 10 MG/ML
2-4 INJECTION, SOLUTION INTRAMUSCULAR; INTRAVENOUS
Status: DISCONTINUED | OUTPATIENT
Start: 2018-01-01 | End: 2018-01-01 | Stop reason: HOSPADM

## 2018-01-01 RX ORDER — DIPHENHYDRAMINE HYDROCHLORIDE 50 MG/ML
12.5 INJECTION, SOLUTION INTRAMUSCULAR; INTRAVENOUS
Status: DISCONTINUED | OUTPATIENT
Start: 2018-01-01 | End: 2018-01-01 | Stop reason: HOSPADM

## 2018-01-01 RX ORDER — VECURONIUM BROMIDE FOR INJECTION 1 MG/ML
INJECTION, POWDER, LYOPHILIZED, FOR SOLUTION INTRAVENOUS AS NEEDED
Status: DISCONTINUED | OUTPATIENT
Start: 2018-01-01 | End: 2018-01-01 | Stop reason: HOSPADM

## 2018-01-01 RX ORDER — DEXTROSE MONOHYDRATE 25 G/50ML
25-50 INJECTION, SOLUTION INTRAVENOUS AS NEEDED
Status: DISCONTINUED | OUTPATIENT
Start: 2018-01-01 | End: 2018-01-01 | Stop reason: HOSPADM

## 2018-01-01 RX ORDER — INSULIN GLARGINE 100 [IU]/ML
5 INJECTION, SOLUTION SUBCUTANEOUS DAILY
Status: DISCONTINUED | OUTPATIENT
Start: 2018-01-01 | End: 2018-01-01 | Stop reason: HOSPADM

## 2018-01-01 RX ORDER — SODIUM CHLORIDE, SODIUM LACTATE, POTASSIUM CHLORIDE, CALCIUM CHLORIDE 600; 310; 30; 20 MG/100ML; MG/100ML; MG/100ML; MG/100ML
25 INJECTION, SOLUTION INTRAVENOUS CONTINUOUS
Status: DISCONTINUED | OUTPATIENT
Start: 2018-01-01 | End: 2018-01-01 | Stop reason: HOSPADM

## 2018-01-01 RX ORDER — NEOSTIGMINE METHYLSULFATE 5 MG/5 ML
SYRINGE (ML) INTRAVENOUS AS NEEDED
Status: DISCONTINUED | OUTPATIENT
Start: 2018-01-01 | End: 2018-01-01 | Stop reason: HOSPADM

## 2018-01-01 RX ORDER — FACIAL-BODY WIPES
10 EACH TOPICAL DAILY
Status: DISCONTINUED | OUTPATIENT
Start: 2018-01-01 | End: 2018-01-01 | Stop reason: HOSPADM

## 2018-01-01 RX ORDER — INSULIN LISPRO 100 [IU]/ML
INJECTION, SOLUTION INTRAVENOUS; SUBCUTANEOUS ONCE
Status: DISCONTINUED | OUTPATIENT
Start: 2018-01-01 | End: 2018-01-01 | Stop reason: HOSPADM

## 2018-01-01 RX ORDER — ONDANSETRON 2 MG/ML
4 INJECTION INTRAMUSCULAR; INTRAVENOUS
Status: DISCONTINUED | OUTPATIENT
Start: 2018-01-01 | End: 2018-01-01 | Stop reason: HOSPADM

## 2018-01-01 RX ORDER — CEFAZOLIN SODIUM 2 G/50ML
2 SOLUTION INTRAVENOUS EVERY 8 HOURS
Status: COMPLETED | OUTPATIENT
Start: 2018-01-01 | End: 2018-01-01

## 2018-01-01 RX ORDER — HEPARIN SODIUM 5000 [USP'U]/ML
5000 INJECTION, SOLUTION INTRAVENOUS; SUBCUTANEOUS
Status: COMPLETED | OUTPATIENT
Start: 2018-01-01 | End: 2018-01-01

## 2018-01-01 RX ORDER — OXYCODONE AND ACETAMINOPHEN 5; 325 MG/1; MG/1
1-2 TABLET ORAL
Qty: 30 TAB | Refills: 0 | Status: SHIPPED | OUTPATIENT
Start: 2018-01-01

## 2018-01-01 RX ORDER — CEFAZOLIN SODIUM 2 G/50ML
2 SOLUTION INTRAVENOUS
Status: COMPLETED | OUTPATIENT
Start: 2018-01-01 | End: 2018-01-01

## 2018-01-01 RX ORDER — MAGNESIUM SULFATE 100 %
4 CRYSTALS MISCELLANEOUS AS NEEDED
Status: DISCONTINUED | OUTPATIENT
Start: 2018-01-01 | End: 2018-01-01 | Stop reason: SDUPTHER

## 2018-01-01 RX ORDER — SODIUM CHLORIDE, SODIUM LACTATE, POTASSIUM CHLORIDE, CALCIUM CHLORIDE 600; 310; 30; 20 MG/100ML; MG/100ML; MG/100ML; MG/100ML
INJECTION, SOLUTION INTRAVENOUS
Status: DISCONTINUED | OUTPATIENT
Start: 2018-01-01 | End: 2018-01-01 | Stop reason: HOSPADM

## 2018-01-01 RX ORDER — MAGNESIUM SULFATE 100 %
4 CRYSTALS MISCELLANEOUS AS NEEDED
Status: DISCONTINUED | OUTPATIENT
Start: 2018-01-01 | End: 2018-01-01 | Stop reason: HOSPADM

## 2018-01-01 RX ORDER — INSULIN GLARGINE 100 [IU]/ML
20 INJECTION, SOLUTION SUBCUTANEOUS
COMMUNITY

## 2018-01-01 RX ORDER — FAMOTIDINE 20 MG/1
20 TABLET, FILM COATED ORAL ONCE
Status: COMPLETED | OUTPATIENT
Start: 2018-01-01 | End: 2018-01-01

## 2018-01-01 RX ORDER — LIDOCAINE HYDROCHLORIDE 20 MG/ML
INJECTION, SOLUTION EPIDURAL; INFILTRATION; INTRACAUDAL; PERINEURAL AS NEEDED
Status: DISCONTINUED | OUTPATIENT
Start: 2018-01-01 | End: 2018-01-01 | Stop reason: HOSPADM

## 2018-01-01 RX ORDER — DOCUSATE SODIUM 100 MG/1
100 CAPSULE, LIQUID FILLED ORAL 2 TIMES DAILY
Status: DISCONTINUED | OUTPATIENT
Start: 2018-01-01 | End: 2018-01-01 | Stop reason: HOSPADM

## 2018-01-01 RX ORDER — INSULIN LISPRO 100 [IU]/ML
INJECTION, SOLUTION INTRAVENOUS; SUBCUTANEOUS
Status: DISCONTINUED | OUTPATIENT
Start: 2018-01-01 | End: 2018-01-01 | Stop reason: HOSPADM

## 2018-01-01 RX ORDER — INSULIN LISPRO 100 [IU]/ML
INJECTION, SOLUTION INTRAVENOUS; SUBCUTANEOUS ONCE
Status: COMPLETED | OUTPATIENT
Start: 2018-01-01 | End: 2018-01-01

## 2018-01-01 RX ORDER — DEXTROSE 50 % IN WATER (D50W) INTRAVENOUS SYRINGE
25-50 AS NEEDED
Status: DISCONTINUED | OUTPATIENT
Start: 2018-01-01 | End: 2018-01-01 | Stop reason: HOSPADM

## 2018-01-01 RX ORDER — HEPARIN SODIUM 5000 [USP'U]/ML
5000 INJECTION, SOLUTION INTRAVENOUS; SUBCUTANEOUS EVERY 8 HOURS
Status: DISCONTINUED | OUTPATIENT
Start: 2018-01-01 | End: 2018-01-01 | Stop reason: HOSPADM

## 2018-01-01 RX ORDER — GLYCOPYRROLATE 0.2 MG/ML
INJECTION INTRAMUSCULAR; INTRAVENOUS AS NEEDED
Status: DISCONTINUED | OUTPATIENT
Start: 2018-01-01 | End: 2018-01-01 | Stop reason: HOSPADM

## 2018-01-01 RX ORDER — FENTANYL CITRATE 50 UG/ML
INJECTION, SOLUTION INTRAMUSCULAR; INTRAVENOUS AS NEEDED
Status: DISCONTINUED | OUTPATIENT
Start: 2018-01-01 | End: 2018-01-01 | Stop reason: HOSPADM

## 2018-01-01 RX ORDER — SODIUM CHLORIDE 0.9 % (FLUSH) 0.9 %
5-10 SYRINGE (ML) INJECTION AS NEEDED
Status: DISCONTINUED | OUTPATIENT
Start: 2018-01-01 | End: 2018-01-01 | Stop reason: HOSPADM

## 2018-01-01 RX ORDER — OXYCODONE AND ACETAMINOPHEN 5; 325 MG/1; MG/1
1-2 TABLET ORAL
Status: DISCONTINUED | OUTPATIENT
Start: 2018-01-01 | End: 2018-01-01 | Stop reason: HOSPADM

## 2018-01-01 RX ORDER — BUPIVACAINE HYDROCHLORIDE 2.5 MG/ML
INJECTION, SOLUTION INFILTRATION; PERINEURAL AS NEEDED
Status: DISCONTINUED | OUTPATIENT
Start: 2018-01-01 | End: 2018-01-01 | Stop reason: HOSPADM

## 2018-01-01 RX ORDER — SODIUM CHLORIDE 0.9 % (FLUSH) 0.9 %
5-10 SYRINGE (ML) INJECTION EVERY 8 HOURS
Status: DISCONTINUED | OUTPATIENT
Start: 2018-01-01 | End: 2018-01-01

## 2018-01-01 RX ORDER — SODIUM CHLORIDE 9 MG/ML
125 INJECTION, SOLUTION INTRAVENOUS CONTINUOUS
Status: DISCONTINUED | OUTPATIENT
Start: 2018-01-01 | End: 2018-01-01 | Stop reason: HOSPADM

## 2018-01-01 RX ORDER — FENTANYL CITRATE 50 UG/ML
50 INJECTION, SOLUTION INTRAMUSCULAR; INTRAVENOUS
Status: DISCONTINUED | OUTPATIENT
Start: 2018-01-01 | End: 2018-01-01 | Stop reason: HOSPADM

## 2018-01-01 RX ORDER — METFORMIN HYDROCHLORIDE 1000 MG/1
1000 TABLET ORAL 2 TIMES DAILY WITH MEALS
COMMUNITY

## 2018-01-01 RX ORDER — OXYCODONE AND ACETAMINOPHEN 5; 325 MG/1; MG/1
1 TABLET ORAL
Status: DISCONTINUED | OUTPATIENT
Start: 2018-01-01 | End: 2018-01-01 | Stop reason: HOSPADM

## 2018-01-01 RX ORDER — EPHEDRINE SULFATE 50 MG/ML
INJECTION, SOLUTION INTRAVENOUS AS NEEDED
Status: DISCONTINUED | OUTPATIENT
Start: 2018-01-01 | End: 2018-01-01 | Stop reason: HOSPADM

## 2018-01-01 RX ORDER — INSULIN LISPRO 100 [IU]/ML
INJECTION, SOLUTION INTRAVENOUS; SUBCUTANEOUS
Status: COMPLETED
Start: 2018-01-01 | End: 2018-01-01

## 2018-01-01 RX ORDER — PROPOFOL 10 MG/ML
INJECTION, EMULSION INTRAVENOUS AS NEEDED
Status: DISCONTINUED | OUTPATIENT
Start: 2018-01-01 | End: 2018-01-01 | Stop reason: HOSPADM

## 2018-01-01 RX ADMIN — VECURONIUM BROMIDE FOR INJECTION 1 MG: 1 INJECTION, POWDER, LYOPHILIZED, FOR SOLUTION INTRAVENOUS at 11:30

## 2018-01-01 RX ADMIN — DOCUSATE SODIUM 100 MG: 100 CAPSULE, LIQUID FILLED ORAL at 08:21

## 2018-01-01 RX ADMIN — CEFAZOLIN SODIUM 2 G: 2 SOLUTION INTRAVENOUS at 00:35

## 2018-01-01 RX ADMIN — INSULIN LISPRO 3 UNITS: 100 INJECTION, SOLUTION INTRAVENOUS; SUBCUTANEOUS at 12:27

## 2018-01-01 RX ADMIN — DOCUSATE SODIUM 100 MG: 100 CAPSULE, LIQUID FILLED ORAL at 17:12

## 2018-01-01 RX ADMIN — OXYCODONE HYDROCHLORIDE AND ACETAMINOPHEN 1 TABLET: 5; 325 TABLET ORAL at 20:52

## 2018-01-01 RX ADMIN — VECURONIUM BROMIDE FOR INJECTION 1 MG: 1 INJECTION, POWDER, LYOPHILIZED, FOR SOLUTION INTRAVENOUS at 12:54

## 2018-01-01 RX ADMIN — INSULIN LISPRO 6 UNITS: 100 INJECTION, SOLUTION INTRAVENOUS; SUBCUTANEOUS at 08:22

## 2018-01-01 RX ADMIN — VECURONIUM BROMIDE FOR INJECTION 6 MG: 1 INJECTION, POWDER, LYOPHILIZED, FOR SOLUTION INTRAVENOUS at 08:34

## 2018-01-01 RX ADMIN — SODIUM CHLORIDE 125 ML/HR: 900 INJECTION, SOLUTION INTRAVENOUS at 18:30

## 2018-01-01 RX ADMIN — DOCUSATE SODIUM 100 MG: 100 CAPSULE, LIQUID FILLED ORAL at 08:31

## 2018-01-01 RX ADMIN — CEFAZOLIN SODIUM 2 G: 2 SOLUTION INTRAVENOUS at 08:54

## 2018-01-01 RX ADMIN — INSULIN LISPRO 3 UNITS: 100 INJECTION, SOLUTION INTRAVENOUS; SUBCUTANEOUS at 13:54

## 2018-01-01 RX ADMIN — VECURONIUM BROMIDE FOR INJECTION 1 MG: 1 INJECTION, POWDER, LYOPHILIZED, FOR SOLUTION INTRAVENOUS at 09:31

## 2018-01-01 RX ADMIN — HYDROMORPHONE HYDROCHLORIDE 0.1 MG: 1 INJECTION, SOLUTION INTRAMUSCULAR; INTRAVENOUS; SUBCUTANEOUS at 13:06

## 2018-01-01 RX ADMIN — OXYCODONE HYDROCHLORIDE AND ACETAMINOPHEN 1 TABLET: 5; 325 TABLET ORAL at 04:18

## 2018-01-01 RX ADMIN — HYDROMORPHONE HYDROCHLORIDE 0.4 MG: 1 INJECTION, SOLUTION INTRAMUSCULAR; INTRAVENOUS; SUBCUTANEOUS at 13:48

## 2018-01-01 RX ADMIN — HEPARIN SODIUM 5000 UNITS: 5000 INJECTION INTRAVENOUS; SUBCUTANEOUS at 00:34

## 2018-01-01 RX ADMIN — INSULIN GLARGINE 5 UNITS: 100 INJECTION, SOLUTION SUBCUTANEOUS at 12:23

## 2018-01-01 RX ADMIN — SODIUM CHLORIDE, SODIUM LACTATE, POTASSIUM CHLORIDE, AND CALCIUM CHLORIDE 25 ML/HR: 600; 310; 30; 20 INJECTION, SOLUTION INTRAVENOUS at 07:21

## 2018-01-01 RX ADMIN — BISACODYL 10 MG: 10 SUPPOSITORY RECTAL at 09:40

## 2018-01-01 RX ADMIN — HEPARIN SODIUM 5000 UNITS: 5000 INJECTION INTRAVENOUS; SUBCUTANEOUS at 08:23

## 2018-01-01 RX ADMIN — VECURONIUM BROMIDE FOR INJECTION 1 MG: 1 INJECTION, POWDER, LYOPHILIZED, FOR SOLUTION INTRAVENOUS at 11:15

## 2018-01-01 RX ADMIN — VECURONIUM BROMIDE FOR INJECTION 1 MG: 1 INJECTION, POWDER, LYOPHILIZED, FOR SOLUTION INTRAVENOUS at 10:48

## 2018-01-01 RX ADMIN — INSULIN LISPRO 3 UNITS: 100 INJECTION, SOLUTION INTRAVENOUS; SUBCUTANEOUS at 17:14

## 2018-01-01 RX ADMIN — GLYCOPYRROLATE 0.6 MG: 0.2 INJECTION INTRAMUSCULAR; INTRAVENOUS at 13:18

## 2018-01-01 RX ADMIN — PROPOFOL 150 MG: 10 INJECTION, EMULSION INTRAVENOUS at 08:34

## 2018-01-01 RX ADMIN — LIDOCAINE HYDROCHLORIDE 50 MG: 20 INJECTION, SOLUTION EPIDURAL; INFILTRATION; INTRACAUDAL; PERINEURAL at 08:34

## 2018-01-01 RX ADMIN — HEPARIN SODIUM 5000 UNITS: 5000 INJECTION INTRAVENOUS; SUBCUTANEOUS at 23:35

## 2018-01-01 RX ADMIN — HYDROMORPHONE HYDROCHLORIDE 1 MG: 1 INJECTION, SOLUTION INTRAMUSCULAR; INTRAVENOUS; SUBCUTANEOUS at 17:03

## 2018-01-01 RX ADMIN — SODIUM CHLORIDE 125 ML/HR: 900 INJECTION, SOLUTION INTRAVENOUS at 02:49

## 2018-01-01 RX ADMIN — HEPARIN SODIUM 5000 UNITS: 5000 INJECTION INTRAVENOUS; SUBCUTANEOUS at 07:21

## 2018-01-01 RX ADMIN — SODIUM CHLORIDE, SODIUM LACTATE, POTASSIUM CHLORIDE, AND CALCIUM CHLORIDE 25 ML/HR: 600; 310; 30; 20 INJECTION, SOLUTION INTRAVENOUS at 14:45

## 2018-01-01 RX ADMIN — HYDROMORPHONE HYDROCHLORIDE 0.5 MG: 1 INJECTION, SOLUTION INTRAMUSCULAR; INTRAVENOUS; SUBCUTANEOUS at 09:24

## 2018-01-01 RX ADMIN — HEPARIN SODIUM 5000 UNITS: 5000 INJECTION INTRAVENOUS; SUBCUTANEOUS at 08:31

## 2018-01-01 RX ADMIN — Medication 10 ML: at 16:00

## 2018-01-01 RX ADMIN — EPHEDRINE SULFATE 10 MG: 50 INJECTION, SOLUTION INTRAVENOUS at 12:53

## 2018-01-01 RX ADMIN — INSULIN LISPRO 3 UNITS: 100 INJECTION, SOLUTION INTRAVENOUS; SUBCUTANEOUS at 21:38

## 2018-01-01 RX ADMIN — OXYCODONE HYDROCHLORIDE AND ACETAMINOPHEN 1 TABLET: 5; 325 TABLET ORAL at 12:43

## 2018-01-01 RX ADMIN — ONDANSETRON 4 MG: 2 INJECTION INTRAMUSCULAR; INTRAVENOUS at 12:45

## 2018-01-01 RX ADMIN — OXYCODONE HYDROCHLORIDE AND ACETAMINOPHEN 1 TABLET: 5; 325 TABLET ORAL at 05:56

## 2018-01-01 RX ADMIN — EPHEDRINE SULFATE 5 MG: 50 INJECTION, SOLUTION INTRAVENOUS at 13:10

## 2018-01-01 RX ADMIN — VECURONIUM BROMIDE FOR INJECTION 4 MG: 1 INJECTION, POWDER, LYOPHILIZED, FOR SOLUTION INTRAVENOUS at 09:10

## 2018-01-01 RX ADMIN — VECURONIUM BROMIDE FOR INJECTION 2 MG: 1 INJECTION, POWDER, LYOPHILIZED, FOR SOLUTION INTRAVENOUS at 10:22

## 2018-01-01 RX ADMIN — FENTANYL CITRATE 50 MCG: 50 INJECTION, SOLUTION INTRAMUSCULAR; INTRAVENOUS at 08:34

## 2018-01-01 RX ADMIN — OXYCODONE HYDROCHLORIDE AND ACETAMINOPHEN 1 TABLET: 5; 325 TABLET ORAL at 00:32

## 2018-01-01 RX ADMIN — INSULIN LISPRO 6 UNITS: 100 INJECTION, SOLUTION INTRAVENOUS; SUBCUTANEOUS at 12:23

## 2018-01-01 RX ADMIN — CEFAZOLIN SODIUM 2 G: 2 SOLUTION INTRAVENOUS at 17:26

## 2018-01-01 RX ADMIN — EPHEDRINE SULFATE 10 MG: 50 INJECTION, SOLUTION INTRAVENOUS at 09:08

## 2018-01-01 RX ADMIN — FAMOTIDINE 20 MG: 20 TABLET ORAL at 07:22

## 2018-01-01 RX ADMIN — OXYCODONE HYDROCHLORIDE AND ACETAMINOPHEN 1 TABLET: 5; 325 TABLET ORAL at 11:44

## 2018-01-01 RX ADMIN — SODIUM CHLORIDE 125 ML/HR: 900 INJECTION, SOLUTION INTRAVENOUS at 09:39

## 2018-01-01 RX ADMIN — FENTANYL CITRATE 50 MCG: 50 INJECTION, SOLUTION INTRAMUSCULAR; INTRAVENOUS at 09:20

## 2018-01-01 RX ADMIN — ONDANSETRON 4 MG: 2 INJECTION, SOLUTION INTRAMUSCULAR; INTRAVENOUS at 18:12

## 2018-01-01 RX ADMIN — CEFAZOLIN SODIUM 2 G: 2 SOLUTION INTRAVENOUS at 09:39

## 2018-01-01 RX ADMIN — VECURONIUM BROMIDE FOR INJECTION 1 MG: 1 INJECTION, POWDER, LYOPHILIZED, FOR SOLUTION INTRAVENOUS at 10:01

## 2018-01-01 RX ADMIN — HEPARIN SODIUM 5000 UNITS: 5000 INJECTION INTRAVENOUS; SUBCUTANEOUS at 16:57

## 2018-01-01 RX ADMIN — INSULIN LISPRO 3 UNITS: 100 INJECTION, SOLUTION INTRAVENOUS; SUBCUTANEOUS at 08:32

## 2018-01-01 RX ADMIN — SODIUM CHLORIDE, SODIUM LACTATE, POTASSIUM CHLORIDE, CALCIUM CHLORIDE: 600; 310; 30; 20 INJECTION, SOLUTION INTRAVENOUS at 08:40

## 2018-01-01 RX ADMIN — DOCUSATE SODIUM 100 MG: 100 CAPSULE, LIQUID FILLED ORAL at 17:24

## 2018-01-01 RX ADMIN — VECURONIUM BROMIDE FOR INJECTION 1 MG: 1 INJECTION, POWDER, LYOPHILIZED, FOR SOLUTION INTRAVENOUS at 12:11

## 2018-01-01 RX ADMIN — INSULIN LISPRO 3 UNITS: 100 INJECTION, SOLUTION INTRAVENOUS; SUBCUTANEOUS at 17:54

## 2018-01-01 RX ADMIN — Medication 4 MG: at 13:18

## 2018-01-01 RX ADMIN — SODIUM CHLORIDE 125 ML/HR: 900 INJECTION, SOLUTION INTRAVENOUS at 17:18

## 2018-01-01 RX ADMIN — VECURONIUM BROMIDE FOR INJECTION 1 MG: 1 INJECTION, POWDER, LYOPHILIZED, FOR SOLUTION INTRAVENOUS at 09:51

## 2018-01-01 RX ADMIN — HEPARIN SODIUM 5000 UNITS: 5000 INJECTION INTRAVENOUS; SUBCUTANEOUS at 17:13

## 2018-04-26 DIAGNOSIS — M17.11 OSTEOARTHRITIS OF RIGHT KNEE, UNSPECIFIED OSTEOARTHRITIS TYPE: Primary | ICD-10-CM

## 2018-09-05 NOTE — PERIOP NOTES
PAT - SURGICAL PRE-ADMISSION INSTRUCTIONS 
 
NAME:  Felicitas Morales                                                          TODAY'S DATE:  9/5/2018 SURGERY DATE:  9/6/2018                                  SURGERY ARRIVAL TIME:   0630 1. Do NOT eat or drink anything, including candy or gum, after MIDNIGHT on 09/05/2018 , unless you have specific instructions from your Surgeon or Anesthesia Provider to do so. 2. No smoking on the day of surgery. 3. No alcohol 24 hours prior to the day of surgery. 4. No recreational drugs for one week prior to the day of surgery. 5. Leave all valuables, including money/purse, at home. 6. Remove all jewelry, nail polish, makeup (including mascara); no lotions, powders, deodorant, or perfume/cologne/after shave. 7. Glasses/Contact lenses and Dentures may be worn to the hospital.  They will be removed prior to surgery. 8. Call your doctor if symptoms of a cold or illness develop within 24 ours prior to surgery. 9. AN ADULT MUST DRIVE YOU HOME AFTER OUTPATIENT SURGERY. 10. If you are having an OUTPATIENT procedure, please make arrangements for a responsible adult to be with you for 24 hours after your surgery. 11. If you are admitted to the hospital, you will be assigned to a bed after surgery is complete. Normally a family member will not be able to see you until you are in your assigned bed. 15. Family is encouraged to accompany you to the hospital.  We ask visitors in the treatment area to be limited to ONE person at a time to ensure patient privacy. EXCEPTIONS WILL BE MADE AS NEEDED. 15. Children under 12 are discouraged from entering the treatment area and need to be supervised by an adult when in the waiting room. Special Instructions: 
 
Bring any pertinent legal medical records., HOLD oral diabetic medication on the MORNING OF surgery. , HOLD metformin/glucophage dose starting the EVENING BEFORE the day of surgery. , Follow physician instructions about insulin. If NO instructions were given, take your usual dose of BASAL insulin the night BEFORE surgery., STOP anticoagulants AT LEAST 1 WEEK PRIOR to your surgery or, follow other MD instructions:  Stopped Nsaids one week ago Patient Prep: 
 
shower with anti-bacterial soap These surgical instructions were reviewed with Tyra Henson during the PAT phone call. Directions: On the morning of surgery, please go to the [address] [place] [address]. Enter the building from the [facility] entrance, 1st floor (next to the Emergency Room entrance). Take the elevator to the 2nd floor. Sign in at the Registration Desk. If you have any questions and/or concerns, please do not hesitate to call: 
(Prior to the day of surgery)  [facility] unit:  [phone] (Day of surgery)  [facility] unit:  [phone]

## 2018-09-06 PROBLEM — C68.9 UROTHELIAL CARCINOMA (HCC): Status: ACTIVE | Noted: 2018-01-01

## 2018-09-06 NOTE — BRIEF OP NOTE
BRIEF OPERATIVE NOTE Date of Procedure: 9/6/2018 Preoperative Diagnosis: renal mass,urotherial carcinoma (HCC)  n38.89 Postoperative Diagnosis: Same as above Procedure(s): 
Davinci left assisted laparoscopic Nephroureterectomy with Retroperitoneal lymph node dissection with mitomycin c   XI ROBOTIC ASSISTED Surgeon(s) and Role: Anni Palma MD - Primary Les Paniagua MD 
      
Surgical Assistant: Maru Mazariegos CSA Surgical Staff: 
Circ-1: Stevo Armstrong RN Scrub Tech-1: Maeola Duverney Scrub Tech-Relief: Suzanna Kitchen Surg Asst-1: Maru Mazariegos Surg Asst-2: Jez Contreras Event Time In Incision Start 5676 Incision Close 1335 Anesthesia: General  
Estimated Blood Loss: 50 cc Specimens:  
ID Type Source Tests Collected by Time Destination 1 : LEFT KIDNEY AND URETER Preservative   Sharda Hernandez MD 9/6/2018 1307 Pathology 2 : PERIAORTIC LYMPH NODES Preservative   Sharda Hernandez MD 9/6/2018 1256 Pathology Findings: As per op report Complications: None Implants: * No implants in log *

## 2018-09-06 NOTE — H&P
History and physical review. The patient has been examined. There have been no significant clinical changes. NAD, CTAB, RRR Left Side marked Ancef On call to OR Consented Plan to proceed as below 
 
  
 
 
     
Encounter Diagnoses  
    ICD-10-CM ICD-9-CM 1. Renal mass N28.89 593.9 2. Urothelial carcinoma (HCC) C68.9 199.1 3. Urinary frequency R35.0 788.41 4. Pre-op testing Z01.818 V72.84  
  
  
Assessment: 1. Pt is a 65 y.o. male with hx of gross hematuria and a left renal mass. Renal US on 11/10/17 showed a 3.1 x 2.2 x 2.2 cm cyst in the midpole the left kidney; no visible calculi; normal renal echotexture and cortical thickness. CTU on 1/12/18 revealed a partially exophytic irregularly-shaped enhancing mass involving the lateral aspect of the interpolar left kidney, precontrast density 24 HU, postcontrast density 64 HU; lesion is compatible with a renal neoplasm, measures 25 x 21 x 22 mm; no adenopathy; unremarkable CT appearance of the left renal vein. Cysto 1/22/18 with 2+ trabeculation, otherwise, negative. Urine cytology 1/22/18 showed atypical but degenerative cells, cannot exclude neoplasm. MRI abdomen 7/17/18 demonstrated the complex enhancing mass in the left kidney is similar to the prior CT and could represent a renal neoplasm; additional simple and complex hemorrhagic or proteinaceous renal cysts are seen bilaterally; no abdominal LAD. Left kidney urine cytology 8/1/18 revealed rare atypical with degenerative changes; due to degenerative changes the significance of these cells is unclear.  
  
2.  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 MRI abdomen 7/17/18 revealed possible urothelial neoplasm in the left renal pelvis and left upper pole collecting system resulting in mild left upper pole hydronephrosis. CXR 7/27/18 negative for mets disease. Stent removed 8/7/18. 
   
3. History of prostate cancer s/p prostatectomy ~10 years ago. Most recent PSA was 0.121 ng/ml on 7/12/18. 
  
4. Non-obstructing 2 mm stone interpolar region of right kidney laterally, noted on CTU 1/12/18. Pt passed a stone while in [facility] 5/9/18.  
  
4. HTN 5. DM 
  
  
Plan: · Discussed robotic assisted nephroureterectomy, regional lymphadenectomy and [facility] instillation with patient in detail with patient. · Discussed pros/cons neoadjuvant chemotherapy, patient desires to proceed with extripation · OR to be scheduled for Left Robotic Assisted Laparoscopic Nephroureterectomy with regional lymphadenectomy with mitomycin C. Will plan for cysto in OR · Pt needs medical clearance from PCP, Dr. Maritza Yi.  
· Will need pre- op labs   
  
Discussion:  
  
We discussed the risks and benefits of robotic nephroureterectomy including, but not limited to, infection, bleeding, blood transfusion, possible conversion to open surgery, possible injury to surrounding organs requiring immediate or delayed repair, possible positive surgical margins or positive LNs with need for additional treatment, chronic kidney disease with subsequent increased risk of cardiovascular morbidity and mortality, possible bladder leak, bladder symptoms, need for prolonged cath, lymphocele related to lymphadenectomy, positioning related nerve injuries, and global anesthesia risks including but not limited to CVA, MI, DVT, PE, pneumonia, and death. The patient expressed understanding and desire to proceed. 
  
  
  
Chief complaint: 
   
Chief Complaint Patient presents with  Bladder Cancer  Renal Mass  
  
  
Subjective: 
Cuco Davis is a 65 y.o. [place]  male who is referred by Claribel López MD for follow-up of his hx of gross hematuria and a left renal mass. Patient followed by Dr. Agata Haywood and is in here consultation today to discuss left robotic-assisted nephroureterectomy. 
  
Renal US on 11/10/17 showed a 3.1 x 2.2 x 2.2 cm cyst in the midpole the left kidney; no visible calculi; normal renal echotexture and cortical thickness. 
  
Patient reported a few episodes of gross hematuria on 12/9/17 with passing some clots. Had one prior episode of gross hematuria several years ago, which he attributes to possibly passing a stone. He states that he voided 3-4 times on 12/9/17 before his gross hematuria resolved. He notes that he takes ASA 81 daily. Cysto 1/22/2018 was normal and cytology was atypical. He reports a history of smoking socially for 20 years off an on.  
  
MRI abdomen 7/17/18 demonstrated the complex enhancing mass in the left kidney is similar to the prior CT and could represent a renal neoplasm; additional simple and complex hemorrhagic/proteinaceous renal cysts are seen bilaterally; no abdominal LAD; possible urothelial neoplasm in the left renal pelvis and left upper pole collecting system resulting in mild left upper pole hydronephrosis.  S/p cysto, bilateral RPG, left URS with renal pelvis tumor biopsies, laser ablation of left upper tract tumor, and left ureteral stent placement on 8/1/18; pathology revealed indeterminate grade pTa left upper tract papillary urothelial carcinoma; the majority of the tumor shows features of a low grade papillary urothelial carcinoma; however, there are several tiny foci with high grade nuclear features, cellular discohesion, acute inflammation and degenerative changes. 
  
He stated that he would prefer to undergo surgery without chemotherapy. Patient denies gross hematuria or dysuria. No flank, abdominal or suprapubic pain. No fevers, chills, nausea or vomiting.  
  
He also has a history of prostate cancer s/p prostatectomy about 10 years ago in [place]. He notes that he was told he did not need to follow-up any more for his prostate cancer after 5 years of surveillance. Last PSA was 0.121 ng/ml on 7/12/18. 
  
AUA SS 17 (4) today 8/7/18. 
  
Other Urological History He denies any flank pain or abdominal pain, however, he endorses occasional mild tenderness in his right flank. Additionally, he presented to the ED 5/9/18 while in [place], [place] for severe left flank pain and notes that he passed a stone at that time.  
  
    
Past Medical History:  
Diagnosis Date  Cancer [facility])    
  Prostate  Diabetes ([facility] [address]    
 Hypertension    
  
     
Past Surgical History:  
Procedure Laterality Date  HX HEENT      
  adenoids (as a child)  HX ORTHOPAEDIC Right    
  Hammertoe  HX ORTHOPAEDIC Left    
  ligament surgery lt knee  HX ORTHOPAEDIC Left    
  tumor lt leg  HX UROLOGICAL   2003  
  Prostatectomy   
  
History reviewed. No pertinent family history. Social History  
  
     
Social History  Marital status:   
    Spouse name: N/A  
 Number of children: N/A  
 Years of education: N/A  
  
   
Occupational History  Not on file.  
  
       
Social History Main Topics  Smoking status: Former Smoker  
    Packs/day: 0.25  
    Years: 40.00  
    Quit date: 18  Smokeless tobacco: Never Used  Alcohol use 1.8 - 2.4 oz/week   
    3 - 4 Cans of beer per week   
      Comment: 1-2 times weekly  Drug use: No  
 Sexual activity: Not on file  
  
Other Topics Concern  Not on file  
  
 Social History Narrative  
  
    
Current Outpatient Prescriptions Medication Sig  JANUVIA 50 mg tablet    
 ibuprofen (MOTRIN) 800 mg tablet TAKE 1 TABLET BY MOUTH 2 TIMES DAILY AS NEEDED  
 GLUCOSAMINE HCL/CHONDROITIN VELOZ (GLUCOSAMINE-CHONDROITIN PO) Take  by mouth.  glyBURIDE (DIABETA) 5 mg tablet Take  by mouth two (2) times daily (with meals).  metFORMIN (GLUCOPHAGE) 500 mg tablet Take  by mouth two (2) times daily (with meals).  lisinopril (PRINIVIL, ZESTRIL) 40 mg tablet Take 40 mg by mouth daily.  hydrochlorothiazide (HYDRODIURIL) 25 mg tablet Take 25 mg by mouth daily.  aspirin delayed-release 81 mg tablet Take  by mouth daily.  MULTIVITAMINS WITH FLUORIDE (MULTI-VITAMIN PO) Take  by mouth.  
  
No current facility-administered medications for this visit.   
  
  
No Known Allergies 
  
Review of Systems Constitutional: Fever: No 
Skin: Rash: No 
HEENT: Hearing difficulty: No 
Eyes: Blurred vision: No 
Cardiovascular: Chest pain: No 
Respiratory: Shortness of breath: No 
Gastrointestinal: Nausea/vomiting: No 
Musculoskeletal: Back pain: No 
Neurological: Weakness: No 
Psychological: Memory loss: No 
Comments/additional findings:  
  
  
  
PHYSICAL EXAM: 
    
Visit Vitals  /78  Ht 6' 4\" (1.93 m)  Wt 183 lb (83 kg)  BMI 22.28 kg/m2  
  
  
Constitutional: WDWN, No acute distress. HEENT: EOMs in tact Respiratory: No respiratory distress or difficulties. CV:  No peripheral swelling noted. Abdomen:  No abdominal masses or tenderness. Skin: Normal color and texture and No rashes or erythema noted Neuro/Psych:  Alert and Oriented x3, affect appropriate. EXT: no cyanosis or edema   
  
Labs Results for orders placed or performed in visit on 08/21/18 AMB POC URINALYSIS DIP STICK AUTO W/O MICRO Result Value Ref Range  
  Color (UA POC)      
  Clarity (UA POC)      
  Glucose (UA POC) 2+ Negative  
  Bilirubin (UA POC) Negative Negative   Ketones (UA POC) Negative Negative  
  Specific gravity (UA POC) 1.015 1.001 - 1.035  
  Blood (UA POC) Negative Negative  
  pH (UA POC) 6.0 4.6 - 8.0  
  Protein (UA POC) Trace Negative  
  Urobilinogen (UA POC) 1 mg/dL 0.2 - 1  
  Nitrites (UA POC) Negative Negative  
  Leukocyte esterase (UA POC) Negative Negative  
  
Cr: 0.7 on 7/12/2018 
  
Surgical Pathology 8/1/18 FINAL DIAGNOSIS: 
LEFT UPPER TRACT KIDNEY TUMOR, BIOPSY: 
- PAPILLARY UROTHELIAL CARCINOMA, GRADE INDETERMINATE 
(SEE COMMENT). 
- NO DEFINITE INVASION IS IDENTIFIED (pTa). - NO MUSCULARIS PROPRIA IS PRESENT. COMMENT: The majority of the tumor shows features of a low grade papillary urothelial carcinoma; 
however, there are several tiny foci with high grade nuclear features, cellular discohesion, acute 
inflammation and degenerative changes. It is not clear whether these changes are reactive/degenerative 
or indicative of a high grade urothelial neoplasm. 
  
Cytology 8/1/18 FINAL DIAGNOSIS: 
KIDNEY URINE, LEFT: RARE ATYPICAL CELLS WITH DEGEERATIVE CHANGES (SEE 
COMMENT).   
  
Radiology: images and report reviewed today CXR 7/27/18 IMPRESSION: 
No acute cardiopulmonary disease. 
  
MRI abdomen W/WO contrast 7/17/18 KIDNEYS: Slightly delayed enhancement of the left kidney compared to the right kidney. There is left upper pole hydronephrosis with apparent nodular filling defects within the left upper pole collecting system. Urothelial thickening and nodularity also seen in the left renal pelvis, possibly related to an underlying urothelial neoplasm. Complex, partially exophytic left renal mass identified on image 62 series 13 measuring 2.5 cm which is similar to the prior exam. This demonstrates internal enhancement following contrast administration. No intralesional fat identified within the mass. Scattered simple and hemorrhagic/proteinaceous cysts also identified bilaterally. LYMPH NODES: No enlarged lymph nodes.  
IMPRESSION 
 1. Possible urothelial neoplasm in the left renal pelvis and left upper pole collecting system resulting in mild left upper pole hydronephrosis. 2. Complex enhancing mass in the left kidney is similar to the prior CT and could represent a renal neoplasm. Additional simple and complex hemorrhagic/proteinaceous renal cysts are seen bilaterally. 3. No bulky lymphadenopathy identified in the abdomen. 4. Cholelithiasis. 
  
CT Urogram 1/12/18 IMPRESSION: 1. Luda Briceño is a partially exophytic irregularly-shaped enhancing mass involving the lateral aspect of the interpolar left kidney, precontrast density 24 HU, postcontrast density 64 HU.  Lesion is compatible with a renal neoplasm, measures 25 x 21 x 22 mm. No adenopathy.  Unremarkable CT appearance of the left renal vein. 2.  2 mm nonobstructing stone interpolar region right kidney laterally. 3.  The urinary bladder is overdistended, reaching the level of the umbilicus.  The posterior wall of the urinary bladder demonstrates trabeculation.  No focal urothelial lesion seen.  
4.  Findings: Cholelithiasis.  Colonic diverticulosis.  Atherosclerosis.  Small fat containing left inguinal hernia. 
  
  
Documentation provided by Jeovany Taylor medical scribe for Jt Belcher MD

## 2018-09-06 NOTE — IP AVS SNAPSHOT
Summary of Care Report The Summary of Care report has been created to help improve care coordination. Users with access to [employer] or [address] (Web-based application) may access additional patient information including the Discharge Summary. If you are not currently a [address] user and need more information, please call the number listed below in the [place] [address] section and ask to be connected with Medical Records. Facility Information Name Address Phone [facility] [address] [facility] [address] [id number] [phone] Patient Information Patient Name Sex  Bertram Romeo ([id number]) Male 1940 Discharge Information Admitting Provider Service Area Unit Noam Pineda MD / [id number] [address] [facility] / [phone] Discharge Provider Discharge Date/Time Discharge Disposition Destination (none) 2018 Evening (Pending) AHR (none) Patient Language Language ENGLISH [13] Hospital Problems as of 2018  Reviewed: 2018  5:38 PM by Noam Pineda MD  
  
  
  
 Class Noted - Resolved Last Modified POA Active Problems Urothelial carcinoma ([address]  2018 - Present 2018 by Noam Pineda MD Unknown Entered by Noam Pineda MD  
  
Non-Hospital Problems as of 2018  Reviewed: 2018  5:38 PM by Noam Pineda MD  
  
  
  
 Class Noted - Resolved Last Modified Active Problems Diabetic foot ulcer ([address]  2010 - Present 2018 by Erendira Miller Entered by Erendira Miller Hammer toe  2010 - Present 2018 by Erendira Miller Entered by Erendira Miller Renal mass  Unknown - Present 2018 by Shaunna Terry LPN Entered by Shaunna Terry LPN You are allergic to the following No active allergies Current Discharge Medication List  
  
START taking these medications Dose & Instructions Dispensing Information Comments  
 docusate sodium 100 mg capsule Commonly known as:  Claire Rinku Dose:  100 mg Take 1 Cap by mouth two (2) times daily as needed for Constipation for up to 90 days. Quantity:  60 Cap Refills:  2  
   
 oxyCODONE-acetaminophen 5-325 mg per tablet Commonly known as:  PERCOCET Dose:  1-2 Tab Take 1-2 Tabs by mouth every four (4) hours as needed for Pain. Max Daily Amount: 12 Tabs. Quantity:  30 Tab Refills:  0 CONTINUE these medications which have NOT CHANGED Dose & Instructions Dispensing Information Comments  
 aspirin delayed-release 81 mg tablet Take  by mouth daily. Refills:  0  
   
 glyBURIDE 5 mg tablet Commonly known as:  Iraida Locks Take  by mouth two (2) times daily (with meals). Refills:  0  
   
 hydroCHLOROthiazide 25 mg tablet Commonly known as:  HYDRODIURIL Dose:  25 mg Take 25 mg by mouth daily. Refills:  0  
   
 ibuprofen 800 mg tablet Commonly known as:  MOTRIN  
 TAKE 1 TABLET BY MOUTH 2 TIMES DAILY AS NEEDED Refills:  2 LANTUS SOLOSTAR U-100 INSULIN 100 unit/mL (3 mL) Inpn Generic drug:  insulin glargine Dose:  20 Units 20 Units by SubCUTAneous route nightly. Refills:  0  
   
 lisinopril 40 mg tablet Commonly known as:  Consuelo Needy Dose:  40 mg Take 40 mg by mouth daily. Refills:  0  
   
 metFORMIN 1,000 mg tablet Commonly known as:  GLUCOPHAGE Dose:  1000 mg Take 1,000 mg by mouth two (2) times daily (with meals). Refills:  0 MULTI-VITAMIN PO Take  by mouth. Refills:  0 SITagliptin 50 mg tablet Commonly known as:  Ferol Chiquito Dose:  50 mg  
50 mg. Refills:  0 Surgery Information ID Date/Time Status Primary Surgeon All Procedures Location  [id number] 9/6/2018 0833 Posted MD Сергей Decker left assisted laparoscopic Nephroureterectomy with Retroperitoneal lymph node dissection with mitomycin c   [facility] OR Follow-up Information Follow up With Details Comments Contact Info Donny Navarrete MD Call Office is closed on wknds. Sched appt on Mon. 1024 [address] [place] [phone] 
[phone] Discharge Instructions Someone will call to arrange cystogram 9/13 
[phone] ext [phone] DISCHARGE SUMMARY from Nurse PATIENT INSTRUCTIONS: 
 
 
F-face looks uneven A-arms unable to move or move unevenly S-speech slurred or non-existent T-time-call 911 as soon as signs and symptoms begin-DO NOT go Back to bed or wait to see if you get better-TIME IS BRAIN. Warning Signs of HEART ATTACK Call 911 if you have these symptoms: 
? Chest discomfort. Most heart attacks involve discomfort in the center of the chest that lasts more than a few minutes, or that goes away and comes back. It can feel like uncomfortable pressure, squeezing, fullness, or pain. ? Discomfort in other areas of the upper body. Symptoms can include pain or discomfort in one or both arms, the back, neck, jaw, or stomach. ? Shortness of breath with or without chest discomfort. ? Other signs may include breaking out in a cold sweat, nausea, or lightheadedness. Don't wait more than five minutes to call [address]! Fast action can save your life. Calling 911 is almost always the fastest way to get lifesaving treatment. Emergency Medical Services staff can begin treatment when they arrive  up to an hour sooner than if someone gets to the hospital by car. The discharge information has been reviewed with the patient and spouse. The patient and spouse verbalized understanding. Discharge medications reviewed with the patient and spouse and appropriate educational materials and side effects teaching were provided. ___________________________________________________________________________________________________________________________________ Patient armband removed and shredded MyChart Activation Thank you for requesting access to [employer]. Please follow the instructions below to securely access and download your online medical record. [employer] allows you to send messages to your doctor, view your test results, renew your prescriptions, schedule appointments, and more. How Do I Sign Up? 1. In your internet browser, go to [email or web address] 
2. Click on the First Time User? Click Here link in the Sign In box. You will be redirect to the New Member Sign Up page. 3. Enter your [employer] Access Code exactly as it appears below. You will not need to use this code after youve completed the sign-up process. If you do not sign up before the expiration date, you must request a new code. [employer] Access Code: [email or web address] Expires: 10/24/2018  2:35 PM (This is the date your [employer] access code will ) 4. Enter the last four digits of your Social Security Number (xxxx) and Date of Birth (mm/dd/yyyy) as indicated and click Submit. You will be taken to the next sign-up page. 5. Create a [employer] ID. This will be your [employer] login ID and cannot be changed, so think of one that is secure and easy to remember. 6. Create a [employer] password. You can change your password at any time. 7. Enter your Password Reset Question and Answer. This can be used at a later time if you forget your password. 8. Enter your e-mail address. You will receive e-mail notification when new information is available in [address] 9. Click Sign Up. You can now view and download portions of your medical record.  
10. Click the Download Summary menu link to download a portable copy of your medical information. Additional Information If you have questions, please visit the Frequently Asked Questions section of the [employer] website at [email or web address] Remember, [employer] is NOT to be used for urgent needs. For medical emergencies, dial 911. Chart Review Routing History No Routing History on File

## 2018-09-06 NOTE — PERIOP NOTES
1344  Patient received in PACU and connected to monitors. Vital signs stable. RN at bedside. Will continue to monitor. [address] blood sugar = 172, medicated per MAR. 
 
1430  Called waiting area to update family, no answer. [address] pt's wife's phone [phone] to update, LM with room #2206. 
 
[phone] to give report, per Yehuda De León, RN will call back for report. 3054  TRANSFER - OUT REPORT: 
 
Verbal report given to Henry Christina RN(name) on Cuco Coma  being transferred to 2200(unit) for routine post - op Report consisted of patients Situation, Background, Assessment and  
Recommendations(SBAR). Information from the following report(s) SBAR, Kardex, OR Summary, Intake/Output, MAR and Recent Results was reviewed with the receiving nurse. Lines:  
Peripheral IV 09/06/18 Left Wrist (Active) Site Assessment Clean, dry, & intact 9/6/2018  2:19 PM  
Phlebitis Assessment 0 9/6/2018  2:19 PM  
Infiltration Assessment 0 9/6/2018  2:19 PM  
Dressing Status Clean, dry, & intact 9/6/2018  2:19 PM  
Dressing Type Transparent;Tape 9/6/2018  2:19 PM  
Hub Color/Line Status Pink; Infusing 9/6/2018  2:19 PM  
Action Taken Open ports on tubing capped 9/6/2018  2:19 PM  
Alcohol Cap Used Yes 9/6/2018  2:19 PM  
   
Peripheral IV 09/06/18 Right Wrist (Active) Site Assessment Clean, dry, & intact 9/6/2018  2:19 PM  
Phlebitis Assessment 0 9/6/2018  2:19 PM  
Infiltration Assessment 0 9/6/2018  2:19 PM  
Dressing Status Clean, dry, & intact 9/6/2018  2:19 PM  
Dressing Type Transparent;Tape 9/6/2018  2:19 PM  
Hub Color/Line Status Green;End cap changed; Capped 9/6/2018  2:19 PM  
Action Taken Open ports on tubing capped 9/6/2018  2:19 PM  
Alcohol Cap Used Yes 9/6/2018  2:19 PM  
  
 
Opportunity for questions and clarification was provided. Patient transported with: 
 Registered Nurse Tech

## 2018-09-06 NOTE — PROGRESS NOTES
1510 patient received via bed Dr. Roy Ormond in to see patient Medicated for pain Pain level a 2 now 1815 zofran given for nausea 
1900 nausea improved Bedside shift report given to Evaristo Eubanks. With sbar

## 2018-09-06 NOTE — IP AVS SNAPSHOT
[facility] 
 
 
 [address] [address] [facility] Patient: Cody Mathews MRN: [id number] NJX:[id number] About your hospitalization You were admitted on:  September 6, 2018 You last received care in the:  [address] [place] [address],[address] You were discharged on:  September 8, 2018 Why you were hospitalized Your primary diagnosis was:  Not on File Your diagnoses also included:  Urothelial Carcinoma (Hcc) Follow-up Information Follow up With Details Comments Contact Info Sam Galeas MD Call Office is closed on wknds. Sched appt on Mon. 1024 [address] [place] [address] 
[phone] Your Scheduled Appointments Tuesday October 09, 2018  1:00 PM EDT  
ESTABLISHED PATIENT with KRISTIAN Meléndez [facility] ([address]) [address] [place] [address] 300 [address] [address]  
[phone] Discharge Orders None A check loli indicates which time of day the medication should be taken. My Medications START taking these medications Instructions Each Dose to Equal  
 Morning Noon Evening Bedtime  
 docusate sodium 100 mg capsule Commonly known as:  Alisa Zhou Your last dose was: Your next dose is: Take 1 Cap by mouth two (2) times daily as needed for Constipation for up to 90 days. 100 mg  
    
   
   
   
  
 oxyCODONE-acetaminophen 5-325 mg per tablet Commonly known as:  PERCOCET Your last dose was: Your next dose is: Take 1-2 Tabs by mouth every four (4) hours as needed for Pain. Max Daily Amount: 12 Tabs. 1-2 Tab CONTINUE taking these medications Instructions Each Dose to Equal  
 Morning Noon Evening Bedtime  
 aspirin delayed-release 81 mg tablet Your last dose was: Your next dose is: Take  by mouth daily. glyBURIDE 5 mg tablet Commonly known as:  Prince Lawn Your last dose was: Your next dose is: Take  by mouth two (2) times daily (with meals). hydroCHLOROthiazide 25 mg tablet Commonly known as:  HYDRODIURIL Your last dose was: Your next dose is: Take 25 mg by mouth daily. 25 mg  
    
   
   
   
  
 ibuprofen 800 mg tablet Commonly known as:  MOTRIN Your last dose was: Your next dose is: TAKE 1 TABLET BY MOUTH 2 TIMES DAILY AS NEEDED  
     
   
   
   
  
 LANTUS SOLOSTAR U-100 INSULIN 100 unit/mL (3 mL) Inpn Generic drug:  insulin glargine Your last dose was: Your next dose is:    
   
   
 20 Units by SubCUTAneous route nightly. 20 Units  
    
   
   
   
  
 lisinopril 40 mg tablet Commonly known as:  Onel Santiago Your last dose was: Your next dose is: Take 40 mg by mouth daily. 40 mg  
    
   
   
   
  
 metFORMIN 1,000 mg tablet Commonly known as:  GLUCOPHAGE Your last dose was: Your next dose is: Take 1,000 mg by mouth two (2) times daily (with meals). 1000 mg MULTI-VITAMIN PO Your last dose was: Your next dose is: Take  by mouth. SITagliptin 50 mg tablet Commonly known as:  Mila Hodge Your last dose was: Your next dose is:    
   
   
 50 mg.  
 50 mg Where to Get Your Medications Information on where to get these meds will be given to you by the nurse or doctor. ! Ask your nurse or doctor about these medications  
  docusate sodium 100 mg capsule  
 oxyCODONE-acetaminophen 5-325 mg per tablet Opioid Education  Prescription Opioids: What You Need to Know: 
 
 
 
F-face looks uneven A-arms unable to move or move unevenly S-speech slurred or non-existent T-time-call 911 as soon as signs and symptoms begin-DO NOT go Back to bed or wait to see if you get better-TIME IS BRAIN. Warning Signs of HEART ATTACK Call 911 if you have these symptoms: 
? Chest discomfort. Most heart attacks involve discomfort in the center of the chest that lasts more than a few minutes, or that goes away and comes back. It can feel like uncomfortable pressure, squeezing, fullness, or pain. ? Discomfort in other areas of the upper body. Symptoms can include pain or discomfort in one or both arms, the back, neck, jaw, or stomach. ? Shortness of breath with or without chest discomfort. ? Other signs may include breaking out in a cold sweat, nausea, or lightheadedness. Don't wait more than five minutes to call [address]! Fast action can save your life. Calling 911 is almost always the fastest way to get lifesaving treatment. Emergency Medical Services staff can begin treatment when they arrive  up to an hour sooner than if someone gets to the hospital by car. The discharge information has been reviewed with the patient and spouse. The patient and spouse verbalized understanding. Discharge medications reviewed with the patient and spouse and appropriate educational materials and side effects teaching were provided. ___________________________________________________________________________________________________________________________________ Patient armband removed and shredded MyChart Activation Thank you for requesting access to Soft Science. Please follow the instructions below to securely access and download your online medical record. Soft Science allows you to send messages to your doctor, view your test results, renew your prescriptions, schedule appointments, and more. How Do I Sign Up? 1. In your internet browser, go to [email or web address] 
2. Click on the First Time User? Click Here link in the Sign In box. You will be redirect to the New Member Sign Up page. 3. Enter your Soft Science Access Code exactly as it appears below. You will not need to use this code after youve completed the sign-up process. If you do not sign up before the expiration date, you must request a new code. Soft Science Access Code: [email or web address] Expires: 10/24/2018  2:35 PM (This is the date your Soft Science access code will ) 4. Enter the last four digits of your Social Security Number (xxxx) and Date of Birth (mm/dd/yyyy) as indicated and click Submit. You will be taken to the next sign-up page. 5. Create a Soft Science ID. This will be your Soft Science login ID and cannot be changed, so think of one that is secure and easy to remember. 6. Create a Soft Science password. You can change your password at any time. 7. Enter your Password Reset Question and Answer. This can be used at a later time if you forget your password. 8. Enter your e-mail address. You will receive e-mail notification when new information is available in [address] 9. Click Sign Up. You can now view and download portions of your medical record. 10. Click the Download Summary menu link to download a portable copy of your medical information. Additional Information If you have questions, please visit the Frequently Asked Questions section of the Soft Science website at [email or web address] Remember, Soft Science is NOT to be used for urgent needs. For medical emergencies, dial 911. Soft Science Announcement We are excited to announce that we are making your provider's discharge notes available to you in Lolay. You will see these notes when they are completed and signed by the physician that discharged you from your recent hospital stay. If you have any questions or concerns about any information you see in Lolay, please call the Health Information Department where you were seen or reach out to your Primary Care Provider for more information about your plan of care. Introducing [facility]! Mitul Armstrong introduces Lolay patient portal. Now you can access parts of your medical record, email your doctor's office, and request medication refills online. 1. In your internet browser, go to [email or web address] 2. Click on the First Time User? Click Here link in the Sign In box. You will see the New Member Sign Up page. 3. Enter your Lolay Access Code exactly as it appears below. You will not need to use this code after youve completed the sign-up process. If you do not sign up before the expiration date, you must request a new code. · Lolay Access Code: [email or web address] Expires: 10/24/2018  2:35 PM 
 
4. Enter the last four digits of your Social Security Number (xxxx) and Date of Birth (mm/dd/yyyy) as indicated and click Submit. You will be taken to the next sign-up page. 5. Create a Lolay ID. This will be your Lolay login ID and cannot be changed, so think of one that is secure and easy to remember. 6. Create a Lolay password. You can change your password at any time. 7. Enter your Password Reset Question and Answer. This can be used at a later time if you forget your password. 8. Enter your e-mail address. You will receive e-mail notification when new information is available in [address] 9. Click Sign Up. You can now view and download portions of your medical record.  
10. Click the Download Summary menu link to download a portable copy of your medical information. If you have questions, please visit the Frequently Asked Questions section of the ReTenanthart website. Remember, BriefCam is NOT to be used for urgent needs. For medical emergencies, dial 911. Now available from your iPhone and Android! Introducing Gustavo Mccain As a [facility] [employer] patient, I wanted to make you aware of our electronic visit tool called Gustavo Mccain. OakleyCatalog Spree allows you to connect within minutes with a medical provider 24 hours a day, seven days a week via a mobile device or tablet or logging into a secure website from your computer. You can access Gustavo Mccain from anywhere in the [place]. A virtual visit might be right for you when you have a simple condition and feel like you just dont want to get out of bed, or cant get away from work for an appointment, when your regular [facility] provider is not available (evenings, weekends or holidays), or when youre out of town and need minor care. Electronic visits cost only $49 and if the Oakley[employer]armAssistant[employer] provider determines a prescription is needed to treat your condition, one can be electronically transmitted to a nearby pharmacy*. Please take a moment to enroll today if you have not already done so. The enrollment process is free and takes just a few minutes. To enroll, please download the Networked Insights radha to your tablet or phone, or visit [email or web address] to enroll on your computer. And, as an [address] [place] [address] patient with a 1jiajie account, the results of your visits will be scanned into your electronic medical record and your primary care provider will be able to view the scanned results. We urge you to continue to see your regular [facility] provider for your ongoing medical care.   And while your primary care provider may not be the one available when you seek a Gustavo Mccain virtual visit, the peace of mind you get from getting a real diagnosis real time can be priceless. For more information on Gustavo Mccain, view our Frequently Asked Questions (FAQs) at [email or web address]. Sincerely, 
 
Richy Baires MD 
Chief Medical Officer [employer] *:  certain medications cannot be prescribed via Gustavo Mccain Providers Seen During Your Hospitalization Provider Specialty Primary office phone Levi Márquez MD Urology [phone] Your Primary Care Physician (PCP) Primary Care Physician Office Phone Office Fax Amparo Leader [phone] You are allergic to the following No active allergies Recent Documentation Height Weight BMI Smoking Status 1.93 m 83.2 kg 22.33 kg/m2 Former Smoker Emergency Contacts Name Discharge Info Relation Home Work Mobile [address] CAREGIVER [3] Spouse [3] [phone] Patient Belongings The following personal items are in your possession at time of discharge: 
  Dental Appliances: None  Visual Aid: Glasses, With patient      Home Medications: None   Jewelry: None  Clothing: Shirt, Undergarments, Pants, Socks, Footwear    Other Valuables: Cell Phone, Eyeglasses, With patient Discharge Instructions Attachments/References NEPHRECTOMY: LAPAROSCOPIC: POST-OP (ENGLISH) Patient Handouts Laparoscopic Nephrectomy: What to Expect at [facility] Your Recovery A nephrectomy is surgery to take out part or all of the kidney. One or both kidneys may be taken out. Sometimes other tissue near the kidney is taken out at the same time. Your belly will feel sore after the surgery. This usually lasts about 1 to 2 weeks. Your doctor will give you pain medicine for this. You may also have other symptoms such as nausea, diarrhea, constipation, gas, or a headache. At first, you may have low energy and get tired quickly. It may take 3 to 6 months for your energy to fully return. Your body can work fine with one healthy kidney. If both kidneys are removed or your remaining kidney is not healthy, your doctor will talk to you about the kind of treatment you will need after surgery. This care sheet gives you a general idea about how long it will take for you to recover. But each person recovers at a different pace. Follow the steps below to get better as quickly as possible. How can you care for yourself at home? Activity 
  · Rest when you feel tired. Getting enough sleep will help you recover.  
  · Try to walk each day. Start by walking a little more than you did the day before. Bit by bit, increase the amount you walk. Walking boosts blood flow and helps prevent pneumonia and constipation.  
  · Avoid exercises that use your belly muscles and strenuous activities such as bicycle riding, jogging, weight lifting, or aerobic exercise until your doctor says it is okay.  
  · For at least 4 weeks, avoid lifting anything that would make you strain. This may include a child, heavy grocery bags and milk containers, a heavy briefcase or backpack, cat litter or dog food bags, or a vacuum .  
  · Hold a pillow over the cuts the doctor made (incisions) when you cough or take deep breaths. This will support your belly and decrease your pain.  
  · Do breathing exercises at home as instructed by your doctor. This will help prevent pneumonia.  
  · Ask your doctor when you can drive again.  
  · You will probably need to take 4 to 6 weeks off from work. It depends on the type of work you do and how you feel.  
  · You may be able to take showers (unless you have a drainage tube near your incisions). If you have a drainage tube, follow your doctor's instructions to empty and care for it. Do not take a bath for the first 2 weeks, or until your doctor tells you it is okay.   · Ask your doctor when it is okay for you to have sex. Diet 
  · You can eat your normal diet. If you were on a special diet for your kidneys before surgery, follow that diet until your doctor tells you to stop.  
  · If your stomach is upset, try bland, low-fat foods like plain rice, broiled chicken, toast, and yogurt.  
  · Drink plenty of fluids (unless your doctor tells you not to).  
  · You may notice that your bowel movements are not regular right after your surgery. This is common. Try to avoid constipation and straining with bowel movements. You may want to take a fiber supplement every day. If you have not had a bowel movement after a couple of days, ask your doctor about taking a mild laxative. Medicines 
  · Your doctor will tell you if and when you can restart your medicines. He or she will also give you instructions about taking any new medicines.  
  · If you take blood thinners, such as warfarin (Coumadin), clopidogrel (Plavix), or aspirin, be sure to talk to your doctor. He or she will tell you if and when to start taking those medicines again. Make sure that you understand exactly what your doctor wants you to do.  
  · Take pain medicines exactly as directed. ¨ If the doctor gave you a prescription medicine for pain, take it as prescribed. ¨ If you are not taking a prescription pain medicine, take an over-the-counter medicine that your doctor recommends. Read and follow all instructions on the label. ¨ Do not take aspirin, ibuprofen (Advil, Motrin), or naproxen (Aleve), or other nonsteroidal anti-inflammatory drugs (NSAIDs) unless your doctor says it is okay.  
  · If you think your pain medicine is making you sick to your stomach: 
¨ Take your medicine after meals (unless your doctor has told you not to). ¨ Ask your doctor for a different pain medicine.  
  · If your doctor prescribed antibiotics, take them as directed.  Do not stop taking them just because you feel better. You need to take the full course of antibiotics. Incision care 
  · If you have strips of tape on the incisions, leave the tape on for a week or until it falls off.  
  · Wash the area around the incisions daily with warm, soapy water and pat it dry. Don't use hydrogen peroxide or alcohol, which can slow healing. You may cover the incisions with gauze bandages if they weep or rub against clothing. Change the bandages every day.  
  · Keep the area around the incisions clean and dry. Follow-up care is a key part of your treatment and safety. Be sure to make and go to all appointments, and call your doctor if you are having problems. It's also a good idea to know your test results and keep a list of the medicines you take. When should you call for help? Call 911 anytime you think you may need emergency care. For example, call if: 
  · You passed out (lost consciousness).  
  · You have chest pain, are short of breath, or cough up blood.  
 Call your doctor now or seek immediate medical care if: 
  · You have pain that does not get better after you take your pain medicine.  
  · You have symptoms of a urinary tract infection. These may include: 
¨ Pain or burning when you urinate. ¨ A frequent need to urinate without being able to pass much urine. ¨ Pain in the flank, which is just below the rib cage and above the waist on either side of the back. ¨ Blood in the urine. ¨ A fever.  
  · You have signs of infection, such as: 
¨ Increased pain, swelling, warmth, or redness. ¨ Red streaks leading from the incisions. ¨ Pus draining from the incisions. ¨ A fever.  
  · You have loose stitches, or your incisions come open.  
  · You are bleeding from the incisions.  
  · You cannot urinate.  
  · You are sick to your stomach or cannot drink fluids.  
  · You have signs of a blood clot in your leg (called a deep vein thrombosis), such as: ¨ Pain in the calf, back of the knee, thigh, or groin. ¨ Redness and swelling in your leg.  
 Watch closely for changes in your health, and be sure to contact your doctor if you are having any problems. Where can you learn more? Go to http://jp-kathleen.info/. Enter [id number] in the search box to learn more about \"Laparoscopic Nephrectomy: What to Expect at Home. \" Current as of: May 12, 2017 Content Version: 11.7 © 1404-1553 TriviaPad, Incorporated. Care instructions adapted under license by Modabound (which disclaims liability or warranty for this information). If you have questions about a medical condition or this instruction, always ask your healthcare professional. [address] any warranty or liability for your use of this information. Please provide this summary of care documentation to your next provider. Signatures-by signing, you are acknowledging that this After Visit Summary has been reviewed with you and you have received a copy. Patient Signature:  ____________________________________________________________ Date:  ____________________________________________________________  
  
Aicha Hyatt Provider Signature:  ____________________________________________________________ Date:  ____________________________________________________________

## 2018-09-06 NOTE — PROGRESS NOTES
Chart reviewed and pt verified demographics. He [place], and [employer] . He has Dr Myriam Shah as PCP. He lives with his wife Jared Best [phone] and is independent with ADL. h ehas a walker at home but does not use it. His plan is to go home at NH, wife to drive. Case Management to follow. Reason for Admission:   Renal mass RRAT Score:   9 Plan for utilizing home health:    Not at this time Likelihood of Readmission:  Low, came for planned procedure Transition of Care Plan:    Home with wife

## 2018-09-06 NOTE — IP AVS SNAPSHOT
[address] Patient: Harish Dahl MRN: [id number] SZM:[id number] A check loli indicates which time of day the medication should be taken. My Medications START taking these medications Instructions Each Dose to Equal  
 Morning Noon Evening Bedtime  
 docusate sodium 100 mg capsule Commonly known as:  Connie Damon Your last dose was: Your next dose is: Take 1 Cap by mouth two (2) times daily as needed for Constipation for up to 90 days. 100 mg  
    
   
   
   
  
 oxyCODONE-acetaminophen 5-325 mg per tablet Commonly known as:  PERCOCET Your last dose was: Your next dose is: Take 1-2 Tabs by mouth every four (4) hours as needed for Pain. Max Daily Amount: 12 Tabs. 1-2 Tab CONTINUE taking these medications Instructions Each Dose to Equal  
 Morning Noon Evening Bedtime  
 aspirin delayed-release 81 mg tablet Your last dose was: Your next dose is: Take  by mouth daily. glyBURIDE 5 mg tablet Commonly known as:  Aylamaya Leems Your last dose was: Your next dose is: Take  by mouth two (2) times daily (with meals). hydroCHLOROthiazide 25 mg tablet Commonly known as:  HYDRODIURIL Your last dose was: Your next dose is: Take 25 mg by mouth daily. 25 mg  
    
   
   
   
  
 ibuprofen 800 mg tablet Commonly known as:  MOTRIN Your last dose was: Your next dose is: TAKE 1 TABLET BY MOUTH 2 TIMES DAILY AS NEEDED  
     
   
   
   
  
 LANTUS SOLOSTAR U-100 INSULIN 100 unit/mL (3 mL) Inpn Generic drug:  insulin glargine Your last dose was: Your next dose is:    
   
   
 20 Units by SubCUTAneous route nightly. 20 Units lisinopril 40 mg tablet Commonly known as:  Therkaren Alcaraz Your last dose was: Your next dose is: Take 40 mg by mouth daily. 40 mg  
    
   
   
   
  
 metFORMIN 1,000 mg tablet Commonly known as:  GLUCOPHAGE Your last dose was: Your next dose is: Take 1,000 mg by mouth two (2) times daily (with meals). 1000 mg MULTI-VITAMIN PO Your last dose was: Your next dose is: Take  by mouth. SITagliptin 50 mg tablet Commonly known as:  Aravind Garcia Your last dose was: Your next dose is:    
   
   
 50 mg.  
 50 mg Where to Get Your Medications Information on where to get these meds will be given to you by the nurse or doctor. ! Ask your nurse or doctor about these medications  
  docusate sodium 100 mg capsule  
 oxyCODONE-acetaminophen 5-325 mg per tablet

## 2018-09-06 NOTE — CONSULTS
Internal Medicine Consult Consult Requested By: Dr. Theo Weems, specialty Subjective HPI: Cory Villeda is a 77 y.o. male with a PMHx of DM who we were consulted for medical management while undergoing Davinci left assisted laparoscopic Nephroureterectomy with Retroperitoneal lymph node dissection with mitomycin c   XI ROBOTIC ASSISTED. PMHx: 
Past Medical History:  
Diagnosis Date  Cancer ([facility] Prostate  Diabetes ([facility] [facility]  Hypertension PSurgHx: 
Past Surgical History:  
Procedure Laterality Date  HX HEENT    
 adenoids (as a child)  HX ORTHOPAEDIC Right Hammertoe  HX ORTHOPAEDIC Left   
 ligament surgery lt knee  HX ORTHOPAEDIC Left   
 tumor lt leg  HX TONSILLECTOMY  HX UROLOGICAL  2003 Prostatectomy SocialHx: 
Social History Social History  Marital status:  Spouse name: N/A  
 Number of children: N/A  
 Years of education: N/A Occupational History  Not on file. Social History Main Topics  Smoking status: Former Smoker Packs/day: 0.25 Years: 40.00 Quit date:   Smokeless tobacco: Never Used  Alcohol use 1.8 - 2.4 oz/week 3 - 4 Cans of beer per week Comment: 1-2 times weekly  Drug use: No  
 Sexual activity: Not on file Other Topics Concern  Not on file Social History Narrative FamilyHx: 
History reviewed. No pertinent family history. Prior to Admission Medications Prescriptions Last Dose Informant Patient Reported? Taking? MULTIVITAMINS WITH FLUORIDE (MULTI-VITAMIN PO) 2018 at Unknown time  Yes Yes Sig: Take  by mouth. SITagliptin (JANUVIA) 50 mg tablet 2018 at Unknown time  Yes Yes Si mg.  
aspirin delayed-release 81 mg tablet 2018 at Unknown time  Yes Yes Sig: Take  by mouth daily. glyBURIDE (DIABETA) 5 mg tablet 2018 at Unknown time  Yes Yes Sig: Take  by mouth two (2) times daily (with meals). hydrochlorothiazide (HYDRODIURIL) 25 mg tablet 2018 at Unknown time  Yes Yes Sig: Take 25 mg by mouth daily. ibuprofen (MOTRIN) 800 mg tablet 2018  Yes No  
Sig: TAKE 1 TABLET BY MOUTH 2 TIMES DAILY AS NEEDED  
insulin glargine (LANTUS SOLOSTAR U-100 INSULIN) 100 unit/mL (3 mL) inpn 2018 at Unknown time  Yes Yes Si Units by SubCUTAneous route nightly. lisinopril (PRINIVIL, ZESTRIL) 40 mg tablet 2018 at Unknown time  Yes Yes Sig: Take 40 mg by mouth daily. metFORMIN (GLUCOPHAGE) 1,000 mg tablet 2018  Yes Yes Sig: Take 1,000 mg by mouth two (2) times daily (with meals). Facility-Administered Medications: None Review of Systems: 
Unable to assess seen in PACU. drowsy Objective Visit Vitals  /52  Pulse 88  Temp 98.2 °F (36.8 °C)  Resp 25  
 Ht 6' 4\" (1.93 m)  Wt 83.2 kg (183 lb 7 oz)  SpO2 92%  BMI 22.33 kg/m2 Physical Exam: 
General Appearance: Drowsy HENT: normocephalic/atraumatic, moist mucus membranes Lungs: CTA with normal respiratory effort CV: RRR, no m/r/g Abdomen: soft, non-tender, normal bowel sounds Extremities: no cyanosis, no peripheral edema Neuro: moves all extremities, no focal deficits Psych: Drowsy Imaging Reviewed: 
No results found. Assessment/Plan Active Hospital Problems Diagnosis Date Noted  Urothelial carcinoma ([facility] [address] 2018 Urothelial carcinoma 
-Davinci left assisted laparoscopic Nephroureterectomy with Retroperitoneal lymph node dissection with mitomycin c   XI ROBOTIC ASSISTED. -defer to primary DM II 
-corrective insulin Essential HTN 
-home meds 
 
- Cont acceptable home medications for chronic conditions  
- DVT protocol I reviewed all available labs and imaging that were available prior to my encounter. We appreciate the consultation for medical management and appreciate being able to be involved with their care during hospitalization. Hoa Smith, [facility] [address]

## 2018-09-06 NOTE — PROGRESS NOTES
TRANSFER - IN REPORT: 
 
Verbal report received from [facility] on Harish Dahl  being received from PACU for routine post - op Report consisted of patients Situation, Background, Assessment and  
Recommendations(SBAR). Information from the following report(s) SBAR and Procedure Summary was reviewed with the receiving nurse. Opportunity for questions and clarification was provided. 1505 Pt received via bed awake and alert in NAD. Lap sites to abdomen c/d/i with SABRINA drain intact. Kennedy draining pale yellow urine. Oriented to call bell, phone and IS with pt giving return demonstration. Wearing O2 via n/c at 2L. Reports pain at 5/10.

## 2018-09-06 NOTE — PERIOP NOTES
10 ml Floseal Hemostatic Matrix, exp 2020-01-09 lot [id number] AND Tisseal Fibrin Sealant exp 02- lot [id number] to sterile field.

## 2018-09-06 NOTE — ANESTHESIA POSTPROCEDURE EVALUATION
Post-Anesthesia Evaluation and Assessment Patient: Allen Baker MRN: [id number]  SSN: [id number] YOB: 1940  Age: 77 y.o. Sex: male Data from PACU flowsheet Cardiovascular Function/Vital Signs Visit Vitals  /52  Pulse 88  Temp 36.8 °C (98.2 °F)  Resp 25  
 Ht 6' 4\" (1.93 m)  Wt 83.2 kg (183 lb 7 oz)  SpO2 92%  BMI 22.33 kg/m2 Patient is status post general anesthesia for Procedure(s): 
Hong Rodriguez left assisted laparoscopic Nephroureterectomy with Retroperitoneal lymph node dissection with mitomycin c   XI ROBOTIC ASSISTED. Nausea/Vomiting: controlled Postoperative hydration reviewed and adequate. Pain: 
Pain Scale 1: Adult Nonverbal Pain Scale (09/06/18 1419) Pain Intensity 1: 0 (09/06/18 1419) Managed Mental Status and Level of Consciousness: Alert and oriented Pulmonary Status:  
O2 Device: Room air (09/06/18 1419) Adequate oxygenation and airway patent Complications related to anesthesia: None Post-anesthesia assessment completed. No concerns Signed By: Khushboo Johnson MD   
 September 6, 2018

## 2018-09-06 NOTE — ANESTHESIA PREPROCEDURE EVALUATION
Anesthetic History No history of anesthetic complications Review of Systems / Medical History Patient summary reviewed and pertinent labs reviewed Pulmonary Within defined limits Neuro/Psych Within defined limits Cardiovascular Within defined limits Exercise tolerance: >4 METS 
  
GI/Hepatic/Renal 
Within defined limits Endo/Other Diabetes: type 2 Other Findings Comments: Documentation of current medication Current medications obtained, documented and obtained? YES Risk Factors for Postoperative nausea/vomiting: 
     History of postoperative nausea/vomiting? NO Female? NO Motion sickness? NO Intended opioid administration for postoperative analgesia? YES Smoking Abstinence: 
Current Smoker? NO Elective Surgery? YES Seen preoperatively by anesthesiologist or proxy prior to day of surgery? YES Pt abstained from smoking 24 hours prior to anesthesia? N/A Preventive care/screening for High Blood Pressure: 
Aged 18 years and older: YES Screened for high blood pressure: YES Patients with high blood pressure referred to primary care provider 
 for BP management: YES Physical Exam 
 
Airway Mallampati: III 
TM Distance: 4 - 6 cm Neck ROM: normal range of motion Mouth opening: Normal 
 
 Cardiovascular Regular rate and rhythm,  S1 and S2 normal,  no murmur, click, rub, or gallop Rhythm: regular Rate: normal 
 
 
 
 Dental 
No notable dental hx Pulmonary Breath sounds clear to auscultation Abdominal 
GI exam deferred Other Findings Anesthetic Plan ASA: 2 Anesthesia type: general 
 
 
 
 
Induction: Intravenous Anesthetic plan and risks discussed with: Patient

## 2018-09-07 NOTE — OP NOTES
[facility] 
OPERATIVE REPORT Feliz Mazariegos 
MR#: [id number] : 1940 ACCOUNT #: [de-identified] DATE OF SERVICE: 2018 PREOPERATIVE DIAGNOSIS:  Left upper tract urothelial carcinoma. POSTOPERATIVE DIAGNOSIS:  Left upper tract urothelial carcinoma. PROCEDURES PERFORMED: 1. Left robotic-assisted laparoscopic nephroureterectomy. 2.  Regional lymphadenectomy. 3.  Mitomycin C instillation. SURGEON:  Janell Crooks MD 
 
ASSISTANT:  Kavin Gamez MD 
 
ANESTHESIA:  General. 
 
FLUIDS:  Crystalloid. ESTIMATED BLOOD LOSS COURSE:  200 mL. SPECIMENS REMOVED: 
1. Left kidney, ureter, and bladder cuff. 2.  Left paraaortic lymph nodes. COMPLICATIONS:  none IMPLANTS:  none DRAINS:  A 15-Pakistani Jose drain and 18-Pakistani Kennedy catheter. INDICATIONS FOR THIS PROCEDURE:  Left upper tract urothelial carcinoma. DETAILS OF THE PROCEDURE:  The patient was first identified in the holding area and taken back to the operating room. Perioperative antibiotics were given and sequential compression device placed. Anesthesia was induced. The patient was placed in the lateral decubitus position with the left side up at 70 degrees. Care was taken to pad all pressure points. The patient was prepped and draped in the usual sterile fashion. Time out was performed. First, a 22-Pakistani 3-way Kennedy catheter was inserted into the patient's bladder. Bladder was drained. Mitomycin was instilled. Kennedy catheter was left clamped for 1 hour and then the clamp was taken off and 3 liters of normal saline was instilled through the bladder to wash the medication out. First, a Veress needle was used to gain access to the abdomen. Drop test was performed. The abdomen was insufflated to 15 mmHg under low pressure. Once this was done, we used a Visiport to gain access to the abdomen in the left upper quadrant after marking our port sites.   We placed our standard [address] robotic trocars in a diagonal line starting approximately 2 fingerbreadths below the costal margin 9 cm down and 2 cm medial for each trocar. A periumbilical assistant port was placed. Once entering the abdomen through the left robotic arm, we then placed the remaining ports under vision. We then closed the periumbilical port with a Luiz-Carley suture. Once this was done, we docked the ShipEarly and used a ProGrasp in the assistant hand, a fenestrated bipolar in the left hand, and a scissor in the right hand. First, we reflected the colon, taking down to the white line. The ureter was identified and it was encircled. We then proceeded to follow the ureter down to the level of the bladder cuff. Because the mitomycin was still indwelling, we then followed the ureter back up and identified the gonadal vein. This was followed to the renal hilum. The hilar vessels were then exposed circumferentially. At this point, we lifted the kidney up and began our lymph node dissection. We used a split and roll technique using Weck clips to assure lymphostasis. The borders of our dissection were the renal vessels superiorly, the psoas muscle below the middle of the aorta medially, and the bifurcation of the common iliac distally. We used clips again and bipolar to assure lymphostasis and hemostasis and delivered the large periaortic packet into a 12 mm EndoCatch bag. At this point, we then took the renal artery and renal vein using a 45 cm JOHN stapler. Once this was done, we did not detach the remaining attachments of the kidney. Now we turned our attention back to the ureter and opened the bladder. We did this superiorly and found the ureteral orifice. We then ensured that we included that in our bladder cuff and delivered the bladder cuff. It should be noted that at the beginning of the dissection, we did clip the ureter with a Weck clip to prevent spillage.   Once the ureter was completely removed, we then closed the bladder in 2 layers using 3-0 V-Loc suture. The bladder was filled with 200 mL. It was watertight. At this point, we drained the bladder again. We then removed the remaining posterior and superior attachments using the vessel sealer device. Once the entire kidney was removed, we placed it in an EndoCatch bag along with the ureter. Next, we assured hemostasis. Through our most inferior port, we placed a SABRINA drain. This was a 15-Bangladeshi. Next, we assured all our needle counts, sponge counts, and instruments counts were correct. We then undocked the robot removing the instruments. All the robotic trocars were removed. A modified Tamez incision was made and the specimen was delivered. We then closed the periumbilical port by tying down the Luiz-Carley. We then closed the Rawleigh Kit incision with a #1 PDS in a running fashion. Once this was done, all port sites were infiltrated with Marcaine, irrigated, and closed with 4-0 Monocryl in a subcuticular fashion. The SABRINA drain was secured in place with 2-0 Vicryl. Surgical glue was applied. Case came to completion. Javier Bain MD 
  
 
[address] /  
D: 09/06/2018 19:13    
T: 09/07/2018 09:11 
JOB #: [id number]

## 2018-09-07 NOTE — PROGRESS NOTES
Progress Note Patient: Harish Dahl MRN: [id number]  SSN: [id number] YOB: 1940  Age: 77 y.o. Sex: male Admit Date: 9/6/2018 LOS: 1 day Subjective: No events, pain controlled, oob Objective:  
 
Vitals:  
 09/06/18 1534 09/06/18 1932 09/07/18 5215 09/07/18 0407 BP: 117/61 114/65 132/67 123/61 Pulse: 87 85 89 99 Resp: 23 23 18 18 Temp: 97.8 °F (36.6 °C) 97.6 °F (36.4 °C) 98.1 °F (36.7 °C) 98.6 °F (37 °C) SpO2: 97%  99% 96% Weight:      
Height:      
  
 
Intake and Output: 
Current Shift: 09/06 1901 - 09/07 0700 In: 375 [P.O.:75; I.V.:300] Out: 950 [Urine:880; Drains:70] Last three shifts: 09/05 0701 - 09/06 1900 In: 1600 [I.V.:1600] Out: 470 [Urine:300; Drains:70] Physical Exam:  
GENERAL: alert, cooperative, no distress, appears stated age LUNG: unlabored HEART: regular rate and rhythm ABDOMEN: Soft, Non tender EXTREMITIES:  extremities normal, atraumatic, no cyanosis or edema SKIN: Normal. 
PSYCHIATRIC: non focal 
INCISION: clean, dry, intact SABRINA serosang Lab/Data Review: 
 
Lab Results Component Value Date/Time WBC 7.9 08/21/2018 12:21 PM  
 HGB 11.4 (L) 09/07/2018 04:35 AM  
 HCT 35.9 (L) 09/07/2018 04:35 AM  
 PLATELET 868 98/78/0336 12:21 PM  
 MCV 98 (H) 08/21/2018 12:21 PM  
 
 
Lab Results Component Value Date/Time Sodium 140 09/07/2018 04:35 AM  
 Potassium 4.7 09/07/2018 04:35 AM  
 Chloride 102 09/07/2018 04:35 AM  
 CO2 31 09/07/2018 04:35 AM  
 Anion gap 7 09/07/2018 04:35 AM  
 Glucose 181 (H) 09/07/2018 04:35 AM  
 BUN 17 09/07/2018 04:35 AM  
 Creatinine 1.16 09/07/2018 04:35 AM  
 BUN/Creatinine ratio 15 09/07/2018 04:35 AM  
 GFR est AA >60 09/07/2018 04:35 AM  
 GFR est non-AA >60 09/07/2018 04:35 AM  
 Calcium 8.8 09/07/2018 04:35 AM  
 
 
 
POD 1 s/p Left RALap NU Assessment:  
 
Active Problems: 
  Urothelial carcinoma ([facility] [address] (9/6/2018) Plan:  
 
Adriane Lim Advance diet Labs reviewed Appreciate medical assistance with DM Heparin Plan if doing well, home tomorrow Signed By: Dnaiel Ware MD   
 September 7, 2018

## 2018-09-07 NOTE — PROGRESS NOTES
conducted an initial consultation and Spiritual Assessment for Sunday Srinivasan, who is a 77 y.o.,male. Patients Primary Language is: [place]. According to the patients EMR Anabaptist Affiliation is: Alex Pena.  
 
The reason the Patient came to the hospital is:  
Patient Active Problem List  
 Diagnosis Date Noted  Urothelial carcinoma ([facility] 09/06/2018  Renal mass  Diabetic foot ulcer ([facility] 02/16/2010  Hammer toe 02/16/2010 The  provided the following Interventions: 
 attempted to Initiate a relationship of care and support with patient in room 2206 this morning. Found patient however resting peacefully once again and therefore unable to communicate. Offered prayer and assurance of continued prayers on patients behalf. Assessment: 
Patient does not have any Jehovah's witness/cultural needs that will affect patients preferences in health care. There are no further spiritual or Jehovah's witness issues which require Spiritual Care Services interventions at this time. Plan: 
Chaplains will continue to follow and will provide pastoral care on an as needed/requested basis [address] 3 Board Certified [employer] Spiritual Care  
[phone]

## 2018-09-07 NOTE — PROGRESS NOTES
1914- Bedside and Verbal shift change report given to Pelon Barrios, RN (oncoming nurse) by Dorothy Catherine RN (offgoing nurse). Report included the following information SBAR, Kardex and MAR.  
 
1300- Pt ambulating in hallway, on room air. Pt returned to bed, left off oxygen, pulse ox 96% on RA. Will continue to monitor 1320- Pt complaint of dizziness. Blood sugar 181, pulse ox 88 %. Pt placed back on 2L NC, pulse ox 98%. Will continue to monitor closely. 1925- Bedside and Verbal shift change report given to Yumiko Trotter RN (oncoming nurse) by Pelon Barrios RN (offgoing nurse). Report included the following information SBAR, Kardex and MAR.

## 2018-09-07 NOTE — PROGRESS NOTES
Problem: Falls - Risk of 
Goal: *Absence of Falls Document Bishop Brownlee Fall Risk and appropriate interventions in the flowsheet. Outcome: Progressing Towards Goal 
Fall Risk Interventions: 
 
Medication Interventions: Patient to call before getting OOB, Evaluate medications/consider consulting pharmacy Elimination Interventions: Call light in reach, Patient to call for help with toileting needs Problem: Pressure Injury - Risk of 
Goal: *Prevention of pressure injury Document Ifeanyi Scale and appropriate interventions in the flowsheet. Outcome: Progressing Towards Goal 
Pressure Injury Interventions: 
Sensory Interventions: Keep linens dry and wrinkle-free, Assess changes in LOC Moisture Interventions: Absorbent underpads Activity Interventions: Increase time out of bed Mobility Interventions: HOB 30 degrees or less Nutrition Interventions: Document food/fluid/supplement intake Friction and Shear Interventions: HOB 30 degrees or less

## 2018-09-07 NOTE — PROGRESS NOTES
Hospitalist Progress Note Mortimer Diesel, MD 
Internal medicine/ Hospitalist 
 
Daily Progress Note: 9/7/2018 11:42 AM 
   
Interval history / Subjective:  
Felicitas Morales is a 77 y.o. male with a PMHx of DM,who on 9/6/18 underwent Taffy Sers left assisted laparoscopic Nephroureterectomy with Retroperitoneal lymph node dissection with mitomycin c   XI ROBOTIC ASSISTED. Hospitalist team consult in co-management for diabetes. He is feeling fine today. Blood glucose 181. Current Facility-Administered Medications Medication Dose Route Frequency  bisacodyl (DULCOLAX) suppository 10 mg  10 mg Rectal DAILY  [START ON 9/8/2018] insulin glargine (LANTUS) injection 5 Units  5 Units SubCUTAneous DAILY  sodium chloride (NS) flush 5-10 mL  5-10 mL IntraVENous PRN  
 oxyCODONE-acetaminophen (PERCOCET) 5-325 mg per tablet 1 Tab  1 Tab Oral Q4H PRN  
 HYDROmorphone (PF) (DILAUDID) injection 1 mg  1 mg IntraVENous Q4H PRN  
 ondansetron (ZOFRAN) injection 4 mg  4 mg IntraVENous Q4H PRN  
 diphenhydrAMINE (BENADRYL) injection 12.5 mg  12.5 mg IntraVENous Q4H PRN  
 docusate sodium (COLACE) capsule 100 mg  100 mg Oral BID  
 glucose chewable tablet 16 g  4 Tab Oral PRN  
 dextrose (D50W) injection syrg 12.5-25 g  25-50 mL IntraVENous PRN  
 heparin (porcine) injection 5,000 Units  5,000 Units SubCUTAneous Q8H  
 insulin lispro (HUMALOG) injection   SubCUTAneous AC&HS  
 glucagon (GLUCAGEN) injection 1 mg  1 mg IntraMUSCular PRN  
 dextrose (D50) infusion 12.5-25 g  25-50 mL IntraVENous PRN  
 0.9% sodium chloride infusion  125 mL/hr IntraVENous CONTINUOUS Review of Systems No complaint. Objective:  
 
Visit Vitals  /60 (BP 1 Location: Right arm, BP Patient Position: Sitting)  Pulse 93  Temp 98.4 °F (36.9 °C)  Resp 20  
 Ht 6' 4\" (1.93 m)  Wt 83.2 kg (183 lb 7 oz)  SpO2 100%  BMI 22.33 kg/m2 O2 Flow Rate (L/min): 2 l/min O2 Device: (P) Room air Temp (24hrs), Av.1 °F (36.7 °C), Min:97.6 °F (36.4 °C), Max:98.6 °F (37 °C) 
 
 
701 - 1900 In: 120 [P.O.:120] Out: -  
1901 - 700 In: 4215.4 [P.O.:655; I.V.:3560.4] Out: 9445 [Urine:1180; Drains:140] P/E: 
General Appearance:NAD,laying comfortably in bed,talking to wife visiting. HENT: normocephalic/atraumatic, moist mucus membranes Lungs: CTA with normal respiratory effort CV: RRR, no m/r/g Abdomen: soft, non-tender, normal bowel sounds Extremities: no cyanosis, no peripheral edema Neuro: moves all extremities, no focal deficits Psych:good mood Data Review Recent Results (from the past 12 hour(s)) METABOLIC PANEL, BASIC Collection Time: 18  4:35 AM  
Result Value Ref Range Sodium 140 136 - 145 mmol/L Potassium 4.7 3.5 - 5.5 mmol/L Chloride 102 100 - 108 mmol/L  
 CO2 31 21 - 32 mmol/L Anion gap 7 3.0 - 18 mmol/L Glucose 181 (H) 74 - 99 mg/dL BUN 17 7.0 - 18 MG/DL Creatinine 1.16 0.6 - 1.3 MG/DL  
 BUN/Creatinine ratio 15 12 - 20 GFR est AA >60 >60 ml/min/1.73m2 GFR est non-AA >60 >60 ml/min/1.73m2 Calcium 8.8 8.5 - 10.1 MG/DL  
HGB & HCT Collection Time: 18  4:35 AM  
Result Value Ref Range HGB 11.4 (L) 13.0 - 16.0 g/dL HCT 35.9 (L) 36.0 - 48.0 % GLUCOSE, POC Collection Time: 18  6:27 AM  
Result Value Ref Range Glucose (POC) 204 (H) 70 - 110 mg/dL Assessment/Plan: Active Problems: 
  Urothelial carcinoma ([address] (2018) Care Plan Urothelial carcinoma 
-Davinci left assisted laparoscopic Nephroureterectomy with Retroperitoneal lymph node dissection with mitomycin c   XI ROBOTIC ASSISTED. -defer to primary 
  
DM II 
-corrective insulin - fair 
  
Essential HTN 
-home meds 
  
- Cont acceptable home medications for chronic conditions Lab reviewed and stable DVT protocol Full code Disposition:Per primary team.Patient medically stable

## 2018-09-07 NOTE — DIABETES MGMT
NUTRITIONAL ASSESSMENT GLYCEMIC CONTROL/ PLAN OF CARE Rajendra Trinidad           77 y.o.           9/6/2018               No diagnosis found. INTERVENTIONS/PLAN:  
1. Adjust diet to 2200 calories consistent CHO to provide adequate energy to closely meet estimated needs. 2.  Suggest 5 units Lantus daily. 3.  Monitor po intake, labs and weights. ASSESSMENT:  
Nutritional Status:  Pt is 91% ideal wt; BMI (calculated): 22.3 kg/m2 (normal BMI but pt at nutrition risk with BMI <23.0 in pt > 65 years). Pt reports weight loss over past 1.5 years. Documented weights indicate 17 lb weight off in past 7 months (9% weight loss). Pt appears thin. Fair po intake. Nutrition Diagnoses:  
Unintentional weight loss due to inadequate energy intake as evidenced by pt reporting 20 to 30 lb weight loss over past 1.5 years. Altered nutrition related labs due to diabetes as evidenced by A1C of 8.5%. Diabetes Management:  
Pt states his MD just started him on daily Lantus injections just 3 days before admission. Recent blood glucose:    
9/7/18:  204, 190, 181 
9/6/18:  108, 172, 152, 80 - received 6 units corrective lispro Within target range (non-ICU: <140; ICU<180): [] Yes   []  No                                 varied Current Insulin regimen:  
Corrective lispro, very insulin resistant dosing ACHS Home medication/insulin regimen: per pt: 
Pt state he just started taking Lantus  20 units/day as of 3 days ago. Januvia 50 mg/d Metformin 1000 mg BID Diabeta 5 mg BID HbA1c:  8.5% - ave BG has been ~ 195 mg/dL over past 3 months. Adequate glycemic control PTA:  [x] Yes, considering co-morbidities     [] No  8.5% SUBJECTIVE/OBJECTIVE: Information obtained from: chart review, pt Pt is s/p laparoscopic Nephroureterectomy 9/6/18 for urothelial carcinoma. PMHx includes:  T2DM, foot ulcer. 9/7/18:  Pt reports his appetite is fair.   He states he had long term weight loss of 20-30 lbs over the past 1.5 years. He denies having food allergies and does not have problems with chewing or swallowing Diet: consistent  CHO Patient Vitals for the past 100 hrs: 
 % Diet Eaten 09/07/18 0906 50 % Medications: [x]                Reviewed IVF:  NS at 125 ml/hr Most Recent POC Glucose:  
Recent Labs  
   09/07/18 
 0435 GLU  181* Labs:  
Lab Results Component Value Date/Time Hemoglobin A1c 8.5 (H) 09/06/2018 08:53 PM  
 
Lab Results Component Value Date/Time Sodium 140 09/07/2018 04:35 AM  
 Potassium 4.7 09/07/2018 04:35 AM  
 Chloride 102 09/07/2018 04:35 AM  
 CO2 31 09/07/2018 04:35 AM  
 Anion gap 7 09/07/2018 04:35 AM  
 Glucose 181 (H) 09/07/2018 04:35 AM  
 BUN 17 09/07/2018 04:35 AM  
 Creatinine 1.16 09/07/2018 04:35 AM  
 Calcium 8.8 09/07/2018 04:35 AM  
 Albumin 3.8 05/04/2015 12:17 PM  
 
 
Anthropometrics: IBW : 91.8 kg (202 lb 6.1 oz), % IBW (Calculated): 90.64 %, BMI (calculated): 22.3 Wt Readings from Last 1 Encounters:  
09/06/18 83.2 kg (183 lb 7 oz) Last Weight Metrics: 
Weight Loss Metrics 9/6/2018 8/21/2018 8/7/2018 8/1/2018 7/26/2018 1/22/2018 12/11/2017 Today's Wt 183 lb 7 oz 183 lb 181 lb 185 lb 186 lb 200 lb 195 lb BMI 22.33 kg/m2 22.28 kg/m2 22.03 kg/m2 22.52 kg/m2 22.64 kg/m2 25 kg/m2 24.05 kg/m2 Ht Readings from Last 1 Encounters:  
09/06/18 6' 4\" (1.93 m) Estimated Nutrition Needs:  2307 Kcals/day, Protein (g): 100 g Fluid (ml): 2300 ml Based on:   [x]          Actual BW    []          ABW   []            Adjusted BW   
    
Nutrition Interventions: 
Adjust diet to 2200 calorie consistent CHO to provide adequate energy to meet estimated needs. Goal:  
Blood glucose will be within target range of  mg/dL by 9/10/18. Pt will consume > 75% meals by 7/12/18. Weight maintenance (+/- 1-2 kg) or weight gain of 1-2 lbs/week by 9/17/18. Nutrition Monitoring and Evaluation [x]     Monitor po intake on meal rounds 
[x]     Continue inpatient monitoring and intervention 
[]     Other: 
 
 
Nutrition Risk:  []   High     [x]  Moderate    []  Minimal/Uncompromised Roz Canales RD, CDE Office:  [phone] Long Range Pager:  [phone]

## 2018-09-07 NOTE — PROGRESS NOTES
1935 Received Bedside and Verbal shift change report from Kyra Orantes, 2450 [facility] report included the following information SBAR, Kardex, Intake/Output and MAR. Pt alert and verbal resting in the bed with family at the bedside denied acute distress and none noted. Call bell within reach will continue to monitor. 2039 Pt remain stable resting in the no acute distress call bell within reach. 2200 Blood glucose 80, pt on clear liquid supplement offered and accepted, no acute distress call bell within reach. 1 Remain stable catheter care given tolerated without complaint. 0125 Pt remain stable resting in the bed no complaint of n/v call bell within reach. 5712 Medicated for pain zacarias/simone drain patent no acute distress call bell within reach. 4288 Assisted oob to bedside chair tolerated with minimal discomfort call bell within reach. 2863 Bedside and Verbal shift change report given to Margaret Zepeda RN (oncoming nurse) by Arcadio Vail RN (offgoing nurse). Report included the following information SBAR, Kardex, Intake/Output, MAR and Recent Results.

## 2018-09-07 NOTE — PROGRESS NOTES
Problem: Falls - Risk of 
Goal: *Absence of Falls Document Hang Pappas Fall Risk and appropriate interventions in the flowsheet. Outcome: Progressing Towards Goal 
Fall Risk Interventions: 
Mobility Interventions: Patient to call before getting OOB, Utilize walker, cane, or other assistive device Medication Interventions: Assess postural VS orthostatic hypotension, Patient to call before getting OOB, Teach patient to arise slowly Elimination Interventions: Call light in reach, Patient to call for help with toileting needs Problem: Pressure Injury - Risk of 
Goal: *Prevention of pressure injury Document Ifeanyi Scale and appropriate interventions in the flowsheet. Outcome: Progressing Towards Goal 
Pressure Injury Interventions: 
Sensory Interventions: Keep linens dry and wrinkle-free, Assess changes in LOC Moisture Interventions: Absorbent underpads Activity Interventions: Increase time out of bed, Pressure redistribution bed/mattress(bed type) Mobility Interventions: HOB 30 degrees or less, Pressure redistribution bed/mattress (bed type) Nutrition Interventions: Document food/fluid/supplement intake Friction and Shear Interventions: Apply protective barrier, creams and emollients, HOB 30 degrees or less

## 2018-09-08 NOTE — ROUTINE PROCESS
0725- Bedside and Verbal shift change report received from LANDON Campbell RN. Report included the following information SBAR, Kardex and MAR. Pt up in recliner resting, family member at bedside, in no apparent distress. 0830- Pt has no c/o pain at this time, pt declined Dulcolax suppository due to having a BM over night, following surgical procedure. 805 Dona Gibbs- Occupational Therapist working w/ pt. Pt has no c/o pain at this time. 1144- Pt given PO pain med. 1223- Pt verbalizes relief from pain. 1320- Pt and spouse given d/c instructions and leg bag teaching. Pt and spouse verbalized understanding, electronic signature provided.

## 2018-09-08 NOTE — PROGRESS NOTES
Problem: Patient Education: Go to Patient Education Activity Goal: Patient/Family Education 
 
physical Therapy EVALUATION and Discharge Patient: Pete Cooper [de-identified]77 y.o. male) Date: 9/8/2018 Primary Diagnosis: renal mass,urotherial carcinoma (HCC)  n38.89 Urothelial carcinoma ([facility] Procedure(s) (LRB): 
Carnella Rave left assisted laparoscopic Nephroureterectomy with Retroperitoneal lymph node dissection with mitomycin c   XI ROBOTIC ASSISTED (Left) 2 Days Post-Op Precautions: Fall PLOF: Independent gait with no device. ASSESSMENT AND RECOMMENDATIONS: 
Based on the objective data described below, the patient presents with supervision/modified Burns with bed mobility and transfers. Independent gait with no device. Patient encouraged to exercise routinely to maintain strength. Skilled physical therapy is not indicated at this time. Discharge Recommendations: None Further Equipment Recommendations for Discharge: N/A Recommendations for nursing: 
Written on communication board: OOB with assist of 1 Verbally communicated to: nurse Krystina SUBJECTIVE:  
Patient stated I can get up and walk.  OBJECTIVE DATA SUMMARY:  
 
Past Medical History:  
Diagnosis Date  Cancer ([facility] [address] Prostate  Diabetes ([facility] [address]  Hypertension Past Surgical History:  
Procedure Laterality Date  HX HEENT    
 adenoids (as a child)  HX ORTHOPAEDIC Right Hammertoe  HX ORTHOPAEDIC Left   
 ligament surgery lt knee  HX ORTHOPAEDIC Left   
 tumor lt leg  HX TONSILLECTOMY  HX UROLOGICAL  2003 Prostatectomy Barriers to Learning/Limitations: None Compensate with: visual, verbal, tactile, kinesthetic cues/model G CODE:  Mobility [id number] Current  CH= 0%. The severity rating is based on the Other [facility] Balance Scale 209 [address] Standing Balance Scale 
0: Pt performs 25% or less of standing activity (Max assist) CN, 100% impaired. 1: Pt supports self with upper extremities but requires therapist assistance. Pt performs 25-50% of effort (Mod assist) CM, 80% to <100% impaired. 1+: Pt supports self with upper extremities but requires therapist assistance. Pt performs >50% effort. (Min assist). CL, 60% to <80% impaired. 2: Pt supports self independently with both upper extremities (walker, crutches, parallel bars). CL, 60% to <80% impaired. 2+: Pt support self independently with 1 upper extremity (cane, crutch, 1 parallel bar). CK, 40% to <60% impaired. 3: Pt stands without upper extremity support for up to 30 seconds. CK, 40% to <60% impaired. 3+: Pt stands without upper extremity support for 30 seconds or greater. CJ, 20% to <40% impaired. 4: Pt independently moves and returns center of gravity 1-2 inches in one plane. CJ, 20% to <40% impaired. 4+: Pt independently moves and returns center of gravity 1-2 inches in multiple planes. CI, 1% to <20% impaired. 5: Pt independently moves and returns center of gravity in all planes greater than 2 inches. CH, 0% impaired. Eval Complexity: History: LOW Complexity : Zero comorbidities / personal factors that will impact the outcome / POCExam:LOW Complexity : 1-2 Standardized tests and measures addressing body structure, function, activity limitation and / or participation in recreation  Presentation: LOW Complexity : Stable, uncomplicated  Clinical Decision Making:Low Complexity [facility] Standing Balance Scale Overall Complexity:LOW Prior Level of Function/Home Situation: Independent gait with no device. Home Situation Home Environment: Private residence # Steps to Enter: 1 One/Two Story Residence: Two story Lift Chair Available:  (has elevators to get to the second floor) Living Alone: No 
Support Systems: Spouse/Significant Other/Partner Patient Expects to be Discharged to[de-identified] Private residence Current DME Used/Available at Home: Cane, straight, Walker, rolling (shower bench) Tub or Shower Type: Shower Critical Behavior: 
Neurologic State: Alert Orientation Level: Oriented X4 Cognition: Appropriate decision making; Appropriate for age attention/concentration; Appropriate safety awareness; Follows commands Safety/Judgement: Awareness of environment; Fall prevention Psychosocial 
Patient Behaviors: Calm; Cooperative Family  Behaviors: Calm;Supportive Purposeful Interaction: Yes Pt Identified Daily Priority: Clinical issues (comment) Caritas Process: Nurture loving kindness;Enable lennox/hope;Establish trust;Nurture spiritual self;Teaching/learning; Attend basic human needs;Create healing environment Caring Interventions: Reassure; Therapeutic modalities Reassure: Therapeutic listening; Informing; Acceptance Therapeutic Modalities: Deep breathing;Humor; Intentional therapeutic touch Skin Condition/Temp: Dry;Warm Family  Behaviors: Calm;Supportive Skin Integrity: Incision (comment) (Lap Sites) Skin Integumentary Skin Color: Appropriate for ethnicity Skin Condition/Temp: Dry;Warm 
Skin Integrity: Incision (comment) (Lap Sites) Turgor: Non-tenting Hair Growth: Present Varicosities: Absent Strength:   
Strength: Within functional limits (both LE) Tone & Sensation:  
Sensation: Intact Range Of Motion: 
AROM: Within functional limits (both LE) Functional Mobility: 
Bed Mobility: 
Rolling: Independent Supine to Sit: Independent Sit to Supine: Independent Scooting: Independent Transfers: 
Sit to Stand: Modified independent;Supervision Stand to Sit: Modified independent;Supervision Balance:  
Sitting: Intact Standing: Intact Ambulation/Gait Training: 
Distance (ft): 200 Feet (ft) Assistive Device: Other (comment) (none) Ambulation - Level of Assistance: Independent Pain: 
Pre treatment pain:  2, abdominal area from gas pain Post treatment pain:   2, abdominal area from gas pain Pain Scale 1: Numeric (0 - 10) Pain Intensity 1: 0 
 Pain Location 1: Abdomen Pain Orientation 1: Anterior Pain Description 1: Aching Pain Intervention(s) 1: Medication (see MAR) Activity Tolerance:  
Good Please refer to the flowsheet for vital signs taken during this treatment. After treatment:  
[x]         Patient left in no apparent distress sitting up in chair 
[]         Patient left in no apparent distress in bed 
[x]         Call bell left within reach [x]         Nursing notified 
[x]         Caregiver present 
[]         Bed alarm activated COMMUNICATION/EDUCATION:  
[x]         Fall prevention education was provided and the patient/caregiver indicated understanding. []         Patient/family have participated as able in goal setting and plan of care. []         Patient/family agree to work toward stated goals and plan of care. []         Patient understands intent and goals of therapy, but is neutral about his/her participation. []         Patient is unable to participate in goal setting and plan of care. Thank you for this referral. 
Jen Steward, PT Time Calculation: 12 mins

## 2018-09-08 NOTE — DISCHARGE SUMMARY
Discharge Summary Patient: Harish Dahl               Sex: male          DOA: 9/6/2018 YOB: 1940      Age:  77 y.o.        LOS:  LOS: 2 days Admit Date: 9/6/2018 Discharge Date: 9/8/2018 Admission Diagnoses: renal mass,urotherial carcinoma ([facility]  n38.89  ; Urothelial * Discharge Diagnoses:   
Problem List as of 9/8/2018  Date Reviewed: 8/21/2018 Codes Class Noted - Resolved Urothelial carcinoma ([facility] ICD-10-CM: C68.9 ICD-9-CM: 199.1  9/6/2018 - Present Renal mass ICD-10-CM: N28.89 ICD-9-CM: 593.9  Unknown - Present Diabetic foot ulcer ([facility] ICD-10-CM: E11.621, C39.917 ICD-9-CM: 250.80, 707.15  2/16/2010 - Present Hammer toe ICD-10-CM: M20.40 ICD-9-CM: 735.4  2/16/2010 - Present Discharge Condition: Stable Hospital Course: Unremarkable: See chart for further detials Progress Note Patient: Harish Dahl MRN: [id number]  SSN: [id number] YOB: 1940  Age: 77 y.o. Sex: male Admit Date: 9/6/2018 LOS: 2 days Subjective: No events, +gas, tolertaing diet, UOOB Objective:  
 
Vitals:  
 09/07/18 2036 09/07/18 2313 09/08/18 9811 09/08/18 2010 BP:  123/63 100/64 107/60 Pulse:  92 88 91 Resp:  18 18 18 Temp:  99 °F (37.2 °C) 98.2 °F (36.8 °C) 98.3 °F (36.8 °C) SpO2: 100% 100% 100% 95% Weight:      
Height:      
  
 
Intake and Output: 
Current Shift: 09/08 0701 - 09/08 1900 In: 240 [P.O.:240] Out: - Last three shifts: 09/06 1901 - 09/08 0700 In: 3975.4 [P.O.:2015; I.V.:1960.4] Out: 3698 [Urine:2730; Drains:107] Physical Exam:  
GENERAL: alert, cooperative, no distress, appears stated age LUNG: unlabored HEART: regular rate and rhythm ABDOMEN: Soft, Non tender EXTREMITIES:  extremities normal, atraumatic, no cyanosis or edema SKIN: Normal. 
PSYCHIATRIC: non focal 
INCISION: clean, dry, intact Lab/Data Review: 
 
Lab Results Component Value Date/Time WBC 8.9 09/08/2018 05:00 AM  
 HGB 10.4 (L) 09/08/2018 05:00 AM  
 HCT 33.3 (L) 09/08/2018 05:00 AM  
 PLATELET 567 89/36/5832 05:00 AM  
 MCV 98.2 (H) 09/08/2018 05:00 AM  
 
 
Lab Results Component Value Date/Time Sodium 141 09/08/2018 05:00 AM  
 Potassium 4.9 09/08/2018 05:00 AM  
 Chloride 105 09/08/2018 05:00 AM  
 CO2 30 09/08/2018 05:00 AM  
 Anion gap 6 09/08/2018 05:00 AM  
 Glucose 165 (H) 09/08/2018 05:00 AM  
 BUN 14 09/08/2018 05:00 AM  
 Creatinine 0.97 09/08/2018 05:00 AM  
 BUN/Creatinine ratio 14 09/08/2018 05:00 AM  
 GFR est AA >60 09/08/2018 05:00 AM  
 GFR est non-AA >60 09/08/2018 05:00 AM  
 Calcium 8.6 09/08/2018 05:00 AM  
 
 
 
POD 2 s/p Left RALap NU Assessment:  
 
Active Problems: 
  Urothelial carcinoma ([facility] [address] (9/6/2018) Plan: SABRINA out Labs stable Home if cleared for home by PT Reg diet GiannaHeatwave Interactiveter Leg bag teaching No heavy lifting >15 lbs for 4 weeks. No driving on narcotics. OK to shower 48 hours after surgery. No baths or submerging incisions for 4 weeks until incisions are completely healed. Call MD if fever >101.5, signs of infection, intractable pain, nausea, vomiting, significant bleeding, worsening shortness of breath or chest pain, painful leg swelling, or any questions or concerns. Signed By: Ginny Adorno MD   
 September 8, 2018 Consults: None Significant Diagnostic Studies: None Discharge Medications:    
Current Discharge Medication List  
  
START taking these medications Details  
docusate sodium (COLACE) 100 mg capsule Take 1 Cap by mouth two (2) times daily as needed for Constipation for up to 90 days. Qty: 60 Cap, Refills: 2  
  
oxyCODONE-acetaminophen (PERCOCET) 5-325 mg per tablet Take 1-2 Tabs by mouth every four (4) hours as needed for Pain. Max Daily Amount: 12 Tabs. Qty: 30 Tab, Refills: 0 CONTINUE these medications which have NOT CHANGED Details SITagliptin (JANUVIA) 50 mg tablet 50 mg.  
  
metFORMIN (GLUCOPHAGE) 1,000 mg tablet Take 1,000 mg by mouth two (2) times daily (with meals). insulin glargine (LANTUS SOLOSTAR U-100 INSULIN) 100 unit/mL (3 mL) inpn 20 Units by SubCUTAneous route nightly. glyBURIDE (DIABETA) 5 mg tablet Take  by mouth two (2) times daily (with meals). lisinopril (PRINIVIL, ZESTRIL) 40 mg tablet Take 40 mg by mouth daily. hydrochlorothiazide (HYDRODIURIL) 25 mg tablet Take 25 mg by mouth daily. aspirin delayed-release 81 mg tablet Take  by mouth daily. MULTIVITAMINS WITH FLUORIDE (MULTI-VITAMIN PO) Take  by mouth. ibuprofen (MOTRIN) 800 mg tablet TAKE 1 TABLET BY MOUTH 2 TIMES DAILY AS NEEDED Refills: 2 Activity: As tolerated Diet: Regular Wound Care: May shower Follow-up: As scheduled

## 2018-09-08 NOTE — PROGRESS NOTES
Received consult  To eval for home health & walker needs. Noted therapy notes, indicating pt is independent with ambulation, no device. No needs identified. Josie Carlos,RN,ext [phone]. Care Management Interventions PCP Verified by CM: Yes 
Palliative Care Criteria Met (RRAT>21 & CHF Dx)?: No 
Mode of Transport at Discharge: Other (see comment) (wife Carli Norman [phone]) Transition of Care Consult (CM Consult): Discharge Planning MyChart Signup: No 
Discharge Durable Medical Equipment: No 
Physical Therapy Consult: Yes Occupational Therapy Consult: Yes Speech Therapy Consult: No 
Current Support Network: Lives with Spouse Confirm Follow Up Transport: Family Plan discussed with Pt/Family/Caregiver: Yes [employer] Information Provided?: No 
Discharge Location Discharge Placement: Home

## 2018-09-08 NOTE — PROGRESS NOTES
Problem: Falls - Risk of 
Goal: *Absence of Falls Document Blondariane Hover Fall Risk and appropriate interventions in the flowsheet. Outcome: Progressing Towards Goal 
Fall Risk Interventions: 
Mobility Interventions: Communicate number of staff needed for ambulation/transfer, Patient to call before getting OOB Medication Interventions: Patient to call before getting OOB, Teach patient to arise slowly Elimination Interventions: Call light in reach, Patient to call for help with toileting needs Problem: Pressure Injury - Risk of 
Goal: *Prevention of pressure injury Document Ifeanyi Scale and appropriate interventions in the flowsheet. Outcome: Progressing Towards Goal 
Pressure Injury Interventions: 
Sensory Interventions: Keep linens dry and wrinkle-free Moisture Interventions: Absorbent underpads Activity Interventions: Increase time out of bed Mobility Interventions: Pressure redistribution bed/mattress (bed type) Nutrition Interventions: Document food/fluid/supplement intake Friction and Shear Interventions: HOB 30 degrees or less

## 2018-09-08 NOTE — PROGRESS NOTES
Problem: Self Care Deficits Care Plan (Adult) Goal: *Acute Goals and Plan of Care (Insert Text) Occupational Therapy EVALUATION/discharge Patient: Nhung Bishop [de-identified]77 y.o. male) Date: 9/8/2018 Primary Diagnosis: renal mass,urotherial carcinoma (HCC)  n38.89 Urothelial carcinoma ([address] Procedure(s) (LRB): 
León Moody left assisted laparoscopic Nephroureterectomy with Retroperitoneal lymph node dissection with mitomycin c   XI ROBOTIC ASSISTED (Left) 2 Days Post-Op Precautions:   Fall ASSESSMENT AND RECOMMENDATIONS: 
Based on the objective data described below, the patient presents with requiring supervision for ADLs and transfers at this time. Pt reports to be independent with ADLs ad transfers PTA. Pt this session participated with STS transfers, toilet transfers and grooming activities standing at sink with no LOB noticed. Pt returned to chair at the end of session in NAD. Educated on importance of UE exercises to maintain strength and endurance for carryover of ADLs. Pt and spouse verbalized understanding for the same. Skilled occupational therapy is not indicated at this time. Discharge Recommendations: Home Health and None Further Equipment Recommendations for Discharge: N/A Barriers to Learning/Limitations: None Compensate with: visual, verbal, tactile, kinesthetic cues/model COMPLEXITY Eval Complexity: History: LOW Complexity : Brief history review ; Examination: LOW Complexity : 1-3 performance deficits relating to physical, cognitive , or psychosocial skils that result in activity limitations and / or participation restrictions ; Decision Making:LOW Complexity : No comorbidities that affect functional and no verbal or physical assistance needed to complete eval tasks  Assessment: Low Complexity G-CODES:  
 
Self Care  Current  CI= 1-19%  D/C  CI= 1-19%. The severity rating is based on the Level of Assistance required for Functional Mobility and ADLs. SUBJECTIVE:  
Patient stated I am doing alright.  OBJECTIVE DATA SUMMARY:  
 
Past Medical History:  
Diagnosis Date  Cancer ([facility] [address] Prostate  Diabetes ([facility] [address]  Hypertension Past Surgical History:  
Procedure Laterality Date  HX HEENT    
 adenoids (as a child)  HX ORTHOPAEDIC Right Hammertoe  HX ORTHOPAEDIC Left   
 ligament surgery lt knee  HX ORTHOPAEDIC Left   
 tumor lt leg  HX TONSILLECTOMY  HX UROLOGICAL  2003 Prostatectomy Prior Level of Function/Home Situation:  
Home Situation Home Environment: Private residence # Steps to Enter: 1 One/Two Story Residence: Two story Lift Chair Available:  (has elevators to get to the second floor) Living Alone: No 
Support Systems: Spouse/Significant Other/Partner Patient Expects to be Discharged to[de-identified] Private residence Current DME Used/Available at Home: Cane, straight, Walker, rolling (shower bench) Tub or Shower Type: Shower [x]     Right hand dominant   []     Left hand dominant Cognitive/Behavioral Status: 
Neurologic State: Alert Orientation Level: Oriented X4 Cognition: Appropriate for age attention/concentration; Follows commands Safety/Judgement: Fall prevention Skin: intact Edema: none Vision/Perceptual:   
Tracking: Able to track stimulus in all quadrants w/o difficulty Coordination: 
Coordination: Within functional limits Fine Motor Skills-Upper: Left Intact; Right Intact Gross Motor Skills-Upper: Left Intact; Right Intact Balance: 
Sitting: Intact Standing: Intact Strength: 
Strength: Within functional limits Tone & Sensation: 
Sensation: Intact Range of Motion: 
AROM: Generally decreased, functional 
 
Functional Mobility and Transfers for ADLs: 
Bed Mobility: 
Transfers: 
Sit to Stand: Supervision Toilet Transfer : Supervision ADL Assessment: 
Feeding: Independent Oral Facial Hygiene/Grooming: Supervision Upper Body Dressing: Supervision Lower Body Dressing: Supervision Toileting: Supervision ADL Intervention: 
Grooming Grooming Assistance: Supervision/set up (standing at sink) Washing Face: Supervision/set-up Washing Hands: Supervision/set-up Brushing Teeth: Supervision/set-up Cognitive Retraining Safety/Judgement: Fall prevention Therapeutic Exercise: 
 
Pain: 
Pt reports /10 pain or discomfort prior to treatment.   
Pt reports /10 pain or discomfort post treatment. Activity Tolerance:  
Good Please refer to the flowsheet for vital signs taken during this treatment. After treatment:  
[x]  Patient left in no apparent distress sitting up in chair 
[]  Patient left in no apparent distress in bed 
[x]  Call bell left within reach [x]  Nursing notified 
[x]  Caregiver present 
[]  Bed alarm activated COMMUNICATION/EDUCATION:  
Communication/Collaboration: 
[x]      Home safety education was provided and the patient/caregiver indicated understanding. [x]      Patient/family have participated as able and agree with findings and recommendations. []      Patient is unable to participate in plan of care at this time. Shari Rodríguez, OTR/L Time Calculation: 25 mins

## 2018-09-08 NOTE — PROGRESS NOTES
Hospitalist Progress Note Julia Anderson MD 
Internal medicine/ Hospitalist 
 
Daily Progress Note: 2018 11:42 AM 
   
Interval history / Subjective:  
Loraine Yusuf is a 77 y.o. male with a PMHx of DM,who on 18 underwent Eyal Sanes left assisted laparoscopic Nephroureterectomy with Retroperitoneal lymph node dissection with mitomycin c   XI ROBOTIC ASSISTED. Hospitalist team consult in co-management for diabetes. He is feeling fine today. Blood glucose 165 today Current Facility-Administered Medications Medication Dose Route Frequency  bisacodyl (DULCOLAX) suppository 10 mg  10 mg Rectal DAILY  insulin glargine (LANTUS) injection 5 Units  5 Units SubCUTAneous DAILY  sodium chloride (NS) flush 5-10 mL  5-10 mL IntraVENous PRN  
 oxyCODONE-acetaminophen (PERCOCET) 5-325 mg per tablet 1 Tab  1 Tab Oral Q4H PRN  
 HYDROmorphone (PF) (DILAUDID) injection 1 mg  1 mg IntraVENous Q4H PRN  
 ondansetron (ZOFRAN) injection 4 mg  4 mg IntraVENous Q4H PRN  
 diphenhydrAMINE (BENADRYL) injection 12.5 mg  12.5 mg IntraVENous Q4H PRN  
 docusate sodium (COLACE) capsule 100 mg  100 mg Oral BID  
 glucose chewable tablet 16 g  4 Tab Oral PRN  
 dextrose (D50W) injection syrg 12.5-25 g  25-50 mL IntraVENous PRN  
 heparin (porcine) injection 5,000 Units  5,000 Units SubCUTAneous Q8H  
 insulin lispro (HUMALOG) injection   SubCUTAneous AC&HS  
 glucagon (GLUCAGEN) injection 1 mg  1 mg IntraMUSCular PRN  
 dextrose (D50) infusion 12.5-25 g  25-50 mL IntraVENous PRN  
 0.9% sodium chloride infusion  125 mL/hr IntraVENous CONTINUOUS Review of Systems No complaint. Objective:  
 
Visit Vitals  /60  Pulse 91  Temp 98.3 °F (36.8 °C)  Resp 18  Ht 6' 4\" (1.93 m)  Wt 83.2 kg (183 lb 7 oz)  SpO2 95%  BMI 22.33 kg/m2 O2 Flow Rate (L/min): 2 l/min O2 Device: Room air Temp (24hrs), Av.4 °F (36.9 °C), Min:97.9 °F (36.6 °C), Max:99 °F (37.2 °C) 09/08 0701 - 09/08 1900 In: 240 [P.O.:240] Out: -  
09/06 1901 - 09/08 0700 In: 3975.4 [P.O.:2015; I.V.:1960.4] Out: 3886 [Urine:2730; Drains:107] P/E: 
General Appearance:NAD,laying comfortably in bed,talking to wife visiting. HENT: normocephalic/atraumatic, moist mucus membranes Lungs: CTA with normal respiratory effort CV: RRR, no m/r/g Abdomen: soft, non-tender, normal bowel sounds Extremities: no cyanosis, no peripheral edema Neuro: moves all extremities, no focal deficits Psych:good mood Data Review Recent Results (from the past 12 hour(s)) CREATININE, FLUID Collection Time: 09/07/18 11:30 PM  
Result Value Ref Range Fluid Type: PERITONEAL FLUID Creatinine, fluid 1.87 MG/DL  
METABOLIC PANEL, BASIC Collection Time: 09/08/18  5:00 AM  
Result Value Ref Range Sodium 141 136 - 145 mmol/L Potassium 4.9 3.5 - 5.5 mmol/L Chloride 105 100 - 108 mmol/L  
 CO2 30 21 - 32 mmol/L Anion gap 6 3.0 - 18 mmol/L Glucose 165 (H) 74 - 99 mg/dL BUN 14 7.0 - 18 MG/DL Creatinine 0.97 0.6 - 1.3 MG/DL  
 BUN/Creatinine ratio 14 12 - 20 GFR est AA >60 >60 ml/min/1.73m2 GFR est non-AA >60 >60 ml/min/1.73m2 Calcium 8.6 8.5 - 10.1 MG/DL  
CBC W/O DIFF Collection Time: 09/08/18  5:00 AM  
Result Value Ref Range WBC 8.9 4.6 - 13.2 K/uL  
 RBC 3.39 (L) 4.70 - 5.50 M/uL  
 HGB 10.4 (L) 13.0 - 16.0 g/dL HCT 33.3 (L) 36.0 - 48.0 % MCV 98.2 (H) 74.0 - 97.0 FL  
 MCH 30.7 24.0 - 34.0 PG  
 MCHC 31.2 31.0 - 37.0 g/dL  
 RDW 12.4 11.6 - 14.5 % PLATELET 755 286 - 375 K/uL MPV 10.4 9.2 - 11.8 FL  
GLUCOSE, POC Collection Time: 09/08/18  7:00 AM  
Result Value Ref Range Glucose (POC) 185 (H) 70 - 110 mg/dL Assessment/Plan: Active Problems: 
  Urothelial carcinoma ([facility] [address] (9/6/2018) Care Plan Urothelial carcinoma 
-Davinci left assisted laparoscopic Nephroureterectomy with Retroperitoneal lymph node dissection with mitomycin c   XI ROBOTIC ASSISTED. -defer to primary 
  
DM II 
-corrective insulin - fair 
  
Essential HTN 
-home meds 
  
- Cont acceptable home medications for chronic conditions Lab reviewed and stable DVT protocol Full code Disposition:Per primary team.Patient medically stable

## 2018-09-08 NOTE — PROGRESS NOTES
1922 Received patient from Estrellita Steward RN. Patient is alert and oriented x 4. Family present x 3 Helpful and patient people noted. 2100 Fresh Ice water provided. 0015 Patient requested to get OOB to bathroom for bowel movement, assisted to standing and walking into bathroom. Large bowel movement attained. 2118 Informed patient we would be walking this am after premedication. 0630 Walked for 150 ' x 1.5 hallways and 4 minutes. 0720 Bedside and Verbal shift change report given to Mariluz Dallas (oncoming nurse) by Alex Esparza RN (offgoing nurse). Report included the following information SBAR, Kardex, Procedure Summary, Intake/Output, MAR and Recent Results.

## 2018-09-08 NOTE — DISCHARGE INSTRUCTIONS
Someone will call to arrange cystogram 9/13  [phone] ext [phone]    DISCHARGE SUMMARY from Nurse    PATIENT INSTRUCTIONS:    After general anesthesia or intravenous sedation, for 24 hours or while taking prescription Narcotics:  · Limit your activities  · Do not drive and operate hazardous machinery  · Do not make important personal or business decisions  · Do  not drink alcoholic beverages  · If you have not urinated within 8 hours after discharge, please contact your surgeon on call. Report the following to your surgeon:  · Excessive pain, swelling, redness or odor of or around the surgical area  · Temperature over 100.5  · Nausea and vomiting lasting longer than 4 hours or if unable to take medications  · Any signs of decreased circulation or nerve impairment to extremity: change in color, persistent  numbness, tingling, coldness or increase pain  · Any questions    What to do at Home:  Recommended activity: Activity as tolerated,       *  Please give a list of your current medications to your Primary Care Provider. *  Please update this list whenever your medications are discontinued, doses are      changed, or new medications (including over-the-counter products) are added. *  Please carry medication information at all times in case of emergency situations. These are general instructions for a healthy lifestyle:    No smoking/ No tobacco products/ Avoid exposure to second hand smoke  Surgeon General's Warning:  Quitting smoking now greatly reduces serious risk to your health.     Obesity, smoking, and sedentary lifestyle greatly increases your risk for illness    A healthy diet, regular physical exercise & weight monitoring are important for maintaining a healthy lifestyle    You may be retaining fluid if you have a history of heart failure or if you experience any of the following symptoms:  Weight gain of 3 pounds or more overnight or 5 pounds in a week, increased swelling in our hands or feet or shortness of breath while lying flat in bed. Please call your doctor as soon as you notice any of these symptoms; do not wait until your next office visit. Recognize signs and symptoms of STROKE:    F-face looks uneven    A-arms unable to move or move unevenly    S-speech slurred or non-existent    T-time-call 911 as soon as signs and symptoms begin-DO NOT go       Back to bed or wait to see if you get better-TIME IS BRAIN. Warning Signs of HEART ATTACK     Call 911 if you have these symptoms:   Chest discomfort. Most heart attacks involve discomfort in the center of the chest that lasts more than a few minutes, or that goes away and comes back. It can feel like uncomfortable pressure, squeezing, fullness, or pain.  Discomfort in other areas of the upper body. Symptoms can include pain or discomfort in one or both arms, the back, neck, jaw, or stomach.  Shortness of breath with or without chest discomfort.  Other signs may include breaking out in a cold sweat, nausea, or lightheadedness. Don't wait more than five minutes to call 911 - MINUTES MATTER! Fast action can save your life. Calling 911 is almost always the fastest way to get lifesaving treatment. Emergency Medical Services staff can begin treatment when they arrive -- up to an hour sooner than if someone gets to the hospital by car. The discharge information has been reviewed with the patient and spouse. The patient and spouse verbalized understanding. Discharge medications reviewed with the patient and spouse and appropriate educational materials and side effects teaching were provided. ___________________________________________________________________________________________________________________________________  Patient armband removed and shredded  MyChart Activation    Thank you for requesting access to [facility]. Please follow the instructions below to securely access and download your online medical record.  [facility] allows you to send messages to your doctor, view your test results, renew your prescriptions, schedule appointments, and more. How Do I Sign Up? 1. In your internet browser, go to [email or web address]  2. Click on the First Time User? Click Here link in the Sign In box. You will be redirect to the New Member Sign Up page. 3. Enter your [employer] Access Code exactly as it appears below. You will not need to use this code after youve completed the sign-up process. If you do not sign up before the expiration date, you must request a new code. [employer] Access Code: [email or web address]  Expires: 10/24/2018  2:35 PM (This is the date your [employer] access code will )    4. Enter the last four digits of your Social Security Number (xxxx) and Date of Birth (mm/dd/yyyy) as indicated and click Submit. You will be taken to the next sign-up page. 5. Create a [employer] ID. This will be your [employer] login ID and cannot be changed, so think of one that is secure and easy to remember. 6. Create a [employer] password. You can change your password at any time. 7. Enter your Password Reset Question and Answer. This can be used at a later time if you forget your password. 8. Enter your e-mail address. You will receive e-mail notification when new information is available in [address] 9. Click Sign Up. You can now view and download portions of your medical record. 10. Click the Download Summary menu link to download a portable copy of your medical information. Additional Information    If you have questions, please visit the Frequently Asked Questions section of the [employer] website at [email or web address] Remember, [employer] is NOT to be used for urgent needs. For medical emergencies, dial 911.

## 2018-09-10 NOTE — PROGRESS NOTES
Patient needs to see Dr. Mercy Nazario for post-op appt please. He has metastatic urothelial carcinoma and will need to discuss tx options. Thanks!

## (undated) DEVICE — 3M™ TEGADERM™ TRANSPARENT FILM DRESSING FRAME STYLE, 1624W, 2-3/8 IN X 2-3/4 IN (6 CM X 7 CM), 100/CT 4CT/CASE: Brand: 3M™ TEGADERM™

## (undated) DEVICE — SUTURE PDS II SZ 0 L27IN ABSRB VLT L36MM CT-1 1/2 CIR Z340H

## (undated) DEVICE — SUTURE V-LOC 180 SZ 3-0 L6IN ABSRB VLT CV-23 L17MM 1/2 CIR VLOCM0804

## (undated) DEVICE — SHEET,DRAPE,70X100,STERILE: Brand: MEDLINE

## (undated) DEVICE — REM POLYHESIVE ADULT PATIENT RETURN ELECTRODE: Brand: VALLEYLAB

## (undated) DEVICE — CLIP LIG ABSRB HEM-LOK LG PUR --

## (undated) DEVICE — SUTURE VCRL SZ 0 L18IN ABSRB UD POLYGLACTIN 910 BRAID TIE J912G

## (undated) DEVICE — SUTURE PROL SZ 4-0 L18IN NONABSORBABLE BLU L13MM P-3 3/8 8699G

## (undated) DEVICE — VESSEL LOOPS,MAXI, BLUE: Brand: DEVON

## (undated) DEVICE — DRAIN SURG 15FR L3/16IN DIA4.7MM SIL CHN RND HUBLESS FULL

## (undated) DEVICE — 1010 S-DRAPE TOWEL DRAPE 10/BX: Brand: STERI-DRAPE™

## (undated) DEVICE — DRSG PATCH ANTIMIC 1INX4.0MM -- CONVERT TO ITEM 356053

## (undated) DEVICE — BODY ALIGNER: Brand: DEROYAL

## (undated) DEVICE — STAPLER INT L340MM 45MM STD 12 FIRING B FRM PWR + GRIPPING

## (undated) DEVICE — INTENDED FOR TISSUE SEPARATION, AND OTHER PROCEDURES THAT REQUIRE A SHARP SURGICAL BLADE TO PUNCTURE OR CUT.: Brand: BARD-PARKER ® STAINLESS STEEL BLADES

## (undated) DEVICE — BLADELESS OBTURATOR: Brand: WECK VISTA

## (undated) DEVICE — FLOSEAL MATRIX IS INDICATED IN SURGICAL PROCEDURES (OTHER THAN IN OPHTHALMIC) AS AN ADJUNCT TO HEMOSTASIS WHEN CONTROL OF BLEEDING BY LIGATURE OR CONVENTIONALPROCEDURES IS INEFFECTIVE OR IMPRACTICAL.: Brand: FLOSEAL HEMOSTATIC MATRIX

## (undated) DEVICE — SYR SEALANT TISSEAL FIBRIN

## (undated) DEVICE — Device

## (undated) DEVICE — ARM DRAPE

## (undated) DEVICE — CATHETER URETH 22FR BLLN 5CC STD LTX 3 W F TWO OPP DRNGE

## (undated) DEVICE — PACKING 8004007 NEURAY 200PK 13X76MM: Brand: NEURAY ®

## (undated) DEVICE — INSTRMT SET WND CLSR SUT PASS --

## (undated) DEVICE — DERMABOND SKIN ADH 0.7ML -- DERMABOND ADVANCED 12/BX

## (undated) DEVICE — RELOAD STPL H2X0.75MM DIA45MM GRY MESENTERY/THIN TISS NAT

## (undated) DEVICE — COLUMN DRAPE

## (undated) DEVICE — LIGHT HANDLE: Brand: DEVON

## (undated) DEVICE — VESSEL SEALER: Brand: ENDOWRIST

## (undated) DEVICE — COVER LT HNDL BLU PLAS

## (undated) DEVICE — PENROSE TUBING RADIOPAQUE: Brand: ARGYLE

## (undated) DEVICE — SEAL UNIV 5-8MM DISP BX/10 -- DA VINCI XI - SNGL USE

## (undated) DEVICE — TIP COVER ACCESSORY

## (undated) DEVICE — CARTRIDGE CLP LIG HEMLOK GRN --

## (undated) DEVICE — NEEDLE HYPO 25GA L1.5IN BVL ORIENTED ECLIPSE

## (undated) DEVICE — SOL IRR NACL 0.9% 500ML POUR --

## (undated) DEVICE — POUCH SPEC RETRV SHFT 15MM 13X23CM GRN POLYUR DBL RNG HNDL

## (undated) DEVICE — KENDALL SCD EXPRESS SLEEVES, KNEE LENGTH, MEDIUM: Brand: KENDALL SCD

## (undated) DEVICE — SUTURE VCRL SZ 2-0 L27IN ABSRB UD L26MM SH 1/2 CIR J417H

## (undated) DEVICE — SUTURE MCRYL SZ 4-0 L27IN ABSRB UD SH L26MM 1/2 CIR Y415H

## (undated) DEVICE — (D)PREP SKN CHLRAPRP APPL 26ML -- CONVERT TO ITEM 371833

## (undated) DEVICE — BLADELESS OPTICAL TROCAR WITH FIXATION CANNULA: Brand: VERSAONE

## (undated) DEVICE — BLADELESS OPTICAL TROCAR WITH FIXATION CANNULA: Brand: VERSAPORT

## (undated) DEVICE — SYR 10ML CTRL LR LCK NSAF LF --

## (undated) DEVICE — BAG DRAIN URIN 2000ML LF STRL -- CONVERT TO ITEM 363123

## (undated) DEVICE — SPONGE HEMOSTAT CELLULS 4X8IN -- SURGICEL

## (undated) DEVICE — DEVON™ KNEE AND BODY STRAP 60" X 3" (1.5 M X 7.6 CM): Brand: DEVON

## (undated) DEVICE — SPECIMEN RETRIEVAL POUCH: Brand: ENDO CATCH GOLD

## (undated) DEVICE — STAPLER SKIN H3.9MM WIRE DIA0.58MM CRWN 6.9MM 35 STPL FIX

## (undated) DEVICE — 3M™ DURAPORE™ SURGICAL TAPE 1538-0, 1/2 INCH X 10 YARD (1,25CM X 9,1M), 24 ROLLS/BOX: Brand: 3M™ DURAPORE™

## (undated) DEVICE — TAPE UMB 1/8X30IN MP COT WHT --

## (undated) DEVICE — CATHETER F BLLN 5CC 18FR 2 W HYDRGEL COAT LESS TRAUM LUB

## (undated) DEVICE — TRAY CATH OD16FR SIL URIN M STATLOK STBL DEV SURSTP

## (undated) DEVICE — SUTURE VCRL SZ 3-0 L27IN ABSRB UD L26MM SH 1/2 CIR J416H

## (undated) DEVICE — DISPOSABLE SUCTION/IRRIGATOR TUBE SET WITH TIP: Brand: AHTO

## (undated) DEVICE — DEVICE WND CLSR V-LOC 3-0 CV-23 90

## (undated) DEVICE — CLIP SUT ENDOSCP F/2-0/3-0/4-0 -- LAPRA-TY

## (undated) DEVICE — SHEARS: Brand: ENDO SHEARS

## (undated) DEVICE — Z DUP USE 2526982 TUBING IRRIG L90IN DIA0.19IN 1 LD W/ CYL DRIP CHMBR CYSTO

## (undated) DEVICE — VISUALIZATION SYSTEM: Brand: CLEARIFY

## (undated) DEVICE — BLADE ASSEMB CLP HAIR FINE --

## (undated) DEVICE — DEVICE SECUREMENT AD W/ TRICOT ANCHR PD FOR F LTX SIL CATH

## (undated) DEVICE — SOLUTION IRRIG 3000ML 0.9% SOD CHL FLX CONT 0797208] ICU MEDICAL INC]

## (undated) DEVICE — APPLIER CLP M/L SHFT DIA5MM 15 LIG LIGAMAX 5

## (undated) DEVICE — APPLICATOR SEAL FLOSEAL 5MM+ --

## (undated) DEVICE — ACCESS PLATFORM FOR MINIMALLY INVASIVE SURGERY.: Brand: GELPORT® LAPAROSCOPIC  SYSTEM

## (undated) DEVICE — ELECTRO LUBE IS A SINGLE PATIENT USE DEVICE THAT IS INTENDED TO BE USED ON ELECTROSURGICAL ELECTRODES TO REDUCE STICKING.: Brand: KEY SURGICAL ELECTRO LUBE

## (undated) DEVICE — STERILE POLYISOPRENE POWDER-FREE SURGICAL GLOVES: Brand: PROTEXIS